# Patient Record
Sex: MALE | URBAN - METROPOLITAN AREA
[De-identification: names, ages, dates, MRNs, and addresses within clinical notes are randomized per-mention and may not be internally consistent; named-entity substitution may affect disease eponyms.]

---

## 2017-03-25 ENCOUNTER — EMERGENCY (EMERGENCY)
Facility: HOSPITAL | Age: 42
LOS: 1 days | Discharge: HOME | End: 2017-03-25
Attending: INTERNAL MEDICINE | Admitting: INTERNAL MEDICINE
Payer: COMMERCIAL

## 2017-03-25 VITALS
OXYGEN SATURATION: 97 % | TEMPERATURE: 98 F | HEART RATE: 96 BPM | HEIGHT: 70 IN | DIASTOLIC BLOOD PRESSURE: 116 MMHG | SYSTOLIC BLOOD PRESSURE: 182 MMHG | RESPIRATION RATE: 18 BRPM | WEIGHT: 199.96 LBS

## 2017-03-25 VITALS
RESPIRATION RATE: 14 BRPM | TEMPERATURE: 99 F | HEART RATE: 74 BPM | OXYGEN SATURATION: 99 % | DIASTOLIC BLOOD PRESSURE: 76 MMHG | SYSTOLIC BLOOD PRESSURE: 142 MMHG

## 2017-03-25 DIAGNOSIS — R07.89 OTHER CHEST PAIN: ICD-10-CM

## 2017-03-25 DIAGNOSIS — F41.9 ANXIETY DISORDER, UNSPECIFIED: ICD-10-CM

## 2017-03-25 DIAGNOSIS — Z41.1 ENCOUNTER FOR COSMETIC SURGERY: Chronic | ICD-10-CM

## 2017-03-25 DIAGNOSIS — R10.10 UPPER ABDOMINAL PAIN, UNSPECIFIED: ICD-10-CM

## 2017-03-25 DIAGNOSIS — R10.13 EPIGASTRIC PAIN: ICD-10-CM

## 2017-03-25 DIAGNOSIS — Z88.0 ALLERGY STATUS TO PENICILLIN: ICD-10-CM

## 2017-03-25 DIAGNOSIS — F17.210 NICOTINE DEPENDENCE, CIGARETTES, UNCOMPLICATED: ICD-10-CM

## 2017-03-25 LAB
ALBUMIN SERPL ELPH-MCNC: 4 G/DL — SIGNIFICANT CHANGE UP (ref 3.3–5)
ALP SERPL-CCNC: 88 U/L — SIGNIFICANT CHANGE UP (ref 40–120)
ALT FLD-CCNC: 55 U/L — SIGNIFICANT CHANGE UP (ref 12–78)
ANION GAP SERPL CALC-SCNC: 7 MMOL/L — SIGNIFICANT CHANGE UP (ref 5–17)
APPEARANCE UR: CLEAR — SIGNIFICANT CHANGE UP
AST SERPL-CCNC: 24 U/L — SIGNIFICANT CHANGE UP (ref 15–37)
BILIRUB SERPL-MCNC: 0.7 MG/DL — SIGNIFICANT CHANGE UP (ref 0.2–1.2)
BILIRUB UR-MCNC: NEGATIVE — SIGNIFICANT CHANGE UP
BUN SERPL-MCNC: 14 MG/DL — SIGNIFICANT CHANGE UP (ref 7–23)
CALCIUM SERPL-MCNC: 9.2 MG/DL — SIGNIFICANT CHANGE UP (ref 8.5–10.1)
CHLORIDE SERPL-SCNC: 103 MMOL/L — SIGNIFICANT CHANGE UP (ref 96–108)
CK SERPL-CCNC: 180 U/L — SIGNIFICANT CHANGE UP (ref 26–308)
CK SERPL-CCNC: 221 U/L — SIGNIFICANT CHANGE UP (ref 26–308)
CO2 SERPL-SCNC: 30 MMOL/L — SIGNIFICANT CHANGE UP (ref 22–31)
COLOR SPEC: YELLOW — SIGNIFICANT CHANGE UP
CREAT SERPL-MCNC: 1.1 MG/DL — SIGNIFICANT CHANGE UP (ref 0.5–1.3)
DIFF PNL FLD: NEGATIVE — SIGNIFICANT CHANGE UP
GLUCOSE SERPL-MCNC: 97 MG/DL — SIGNIFICANT CHANGE UP (ref 70–99)
GLUCOSE UR QL: NEGATIVE — SIGNIFICANT CHANGE UP
HCT VFR BLD CALC: 48.6 % — SIGNIFICANT CHANGE UP (ref 39–50)
HGB BLD-MCNC: 16.6 G/DL — SIGNIFICANT CHANGE UP (ref 13–17)
KETONES UR-MCNC: NEGATIVE — SIGNIFICANT CHANGE UP
LEUKOCYTE ESTERASE UR-ACNC: NEGATIVE — SIGNIFICANT CHANGE UP
MCHC RBC-ENTMCNC: 30.2 PG — SIGNIFICANT CHANGE UP (ref 27–34)
MCHC RBC-ENTMCNC: 34.3 GM/DL — SIGNIFICANT CHANGE UP (ref 32–36)
MCV RBC AUTO: 88.3 FL — SIGNIFICANT CHANGE UP (ref 80–100)
NITRITE UR-MCNC: NEGATIVE — SIGNIFICANT CHANGE UP
PH UR: 8 — SIGNIFICANT CHANGE UP (ref 4.8–8)
PLATELET # BLD AUTO: 288 K/UL — SIGNIFICANT CHANGE UP (ref 150–400)
POTASSIUM SERPL-MCNC: 4 MMOL/L — SIGNIFICANT CHANGE UP (ref 3.5–5.3)
POTASSIUM SERPL-SCNC: 4 MMOL/L — SIGNIFICANT CHANGE UP (ref 3.5–5.3)
PROT SERPL-MCNC: 7.2 G/DL — SIGNIFICANT CHANGE UP (ref 6–8.3)
PROT UR-MCNC: NEGATIVE — SIGNIFICANT CHANGE UP
RBC # BLD: 5.5 M/UL — SIGNIFICANT CHANGE UP (ref 4.2–5.8)
RBC # FLD: 11.2 % — SIGNIFICANT CHANGE UP (ref 10.3–14.5)
SODIUM SERPL-SCNC: 140 MMOL/L — SIGNIFICANT CHANGE UP (ref 135–145)
SP GR SPEC: 1.01 — SIGNIFICANT CHANGE UP (ref 1.01–1.02)
TROPONIN I SERPL-MCNC: <.015 NG/ML — SIGNIFICANT CHANGE UP (ref 0.01–0.04)
TROPONIN I SERPL-MCNC: <.015 NG/ML — SIGNIFICANT CHANGE UP (ref 0.01–0.04)
UROBILINOGEN FLD QL: NEGATIVE — SIGNIFICANT CHANGE UP
WBC # BLD: 13.2 K/UL — HIGH (ref 3.8–10.5)
WBC # FLD AUTO: 13.2 K/UL — HIGH (ref 3.8–10.5)

## 2017-03-25 PROCEDURE — 80053 COMPREHEN METABOLIC PANEL: CPT

## 2017-03-25 PROCEDURE — 76705 ECHO EXAM OF ABDOMEN: CPT | Mod: 26

## 2017-03-25 PROCEDURE — 99255 IP/OBS CONSLTJ NEW/EST HI 80: CPT

## 2017-03-25 PROCEDURE — 71010: CPT | Mod: 26

## 2017-03-25 PROCEDURE — 82550 ASSAY OF CK (CPK): CPT

## 2017-03-25 PROCEDURE — 87086 URINE CULTURE/COLONY COUNT: CPT

## 2017-03-25 PROCEDURE — 76705 ECHO EXAM OF ABDOMEN: CPT

## 2017-03-25 PROCEDURE — 81003 URINALYSIS AUTO W/O SCOPE: CPT

## 2017-03-25 PROCEDURE — 96374 THER/PROPH/DIAG INJ IV PUSH: CPT

## 2017-03-25 PROCEDURE — 84484 ASSAY OF TROPONIN QUANT: CPT

## 2017-03-25 PROCEDURE — 85027 COMPLETE CBC AUTOMATED: CPT

## 2017-03-25 PROCEDURE — 99285 EMERGENCY DEPT VISIT HI MDM: CPT | Mod: 25

## 2017-03-25 PROCEDURE — 93010 ELECTROCARDIOGRAM REPORT: CPT

## 2017-03-25 PROCEDURE — 71045 X-RAY EXAM CHEST 1 VIEW: CPT

## 2017-03-25 PROCEDURE — 93005 ELECTROCARDIOGRAM TRACING: CPT

## 2017-03-25 PROCEDURE — 99284 EMERGENCY DEPT VISIT MOD MDM: CPT | Mod: 25

## 2017-03-25 RX ORDER — LABETALOL HCL 100 MG
10 TABLET ORAL ONCE
Qty: 0 | Refills: 0 | Status: DISCONTINUED | OUTPATIENT
Start: 2017-03-25 | End: 2017-03-25

## 2017-03-25 RX ORDER — PANTOPRAZOLE SODIUM 20 MG/1
40 TABLET, DELAYED RELEASE ORAL ONCE
Qty: 0 | Refills: 0 | Status: COMPLETED | OUTPATIENT
Start: 2017-03-25 | End: 2017-03-25

## 2017-03-25 RX ORDER — ASPIRIN/CALCIUM CARB/MAGNESIUM 324 MG
325 TABLET ORAL ONCE
Qty: 0 | Refills: 0 | Status: COMPLETED | OUTPATIENT
Start: 2017-03-25 | End: 2017-03-25

## 2017-03-25 RX ORDER — ALPRAZOLAM 0.25 MG
0.25 TABLET ORAL ONCE
Qty: 0 | Refills: 0 | Status: DISCONTINUED | OUTPATIENT
Start: 2017-03-25 | End: 2017-03-25

## 2017-03-25 RX ADMIN — Medication 325 MILLIGRAM(S): at 03:28

## 2017-03-25 RX ADMIN — Medication 0.25 MILLIGRAM(S): at 03:28

## 2017-03-25 RX ADMIN — PANTOPRAZOLE SODIUM 40 MILLIGRAM(S): 20 TABLET, DELAYED RELEASE ORAL at 03:28

## 2017-03-25 NOTE — ED ADULT NURSE REASSESSMENT NOTE - GENERAL PATIENT STATE
cooperative/comfortable appearance/improvement verbalized
cooperative/improvement verbalized/comfortable appearance
comfortable appearance/cooperative

## 2017-03-25 NOTE — ED ADULT NURSE REASSESSMENT NOTE - COMFORT CARE
assisted to bathroom
po fluids offered/plan of care explained
plan of care explained/meal provided/po fluids offered

## 2017-03-25 NOTE — ED ADULT NURSE NOTE - NS ED NURSE DC INFO COMPLEXITY
Simple: Patient demonstrates quick and easy understanding/Patient asked questions/Verbalized Understanding/Returned Demonstration

## 2017-03-26 LAB
CULTURE RESULTS: NO GROWTH — SIGNIFICANT CHANGE UP
SPECIMEN SOURCE: SIGNIFICANT CHANGE UP

## 2017-06-05 ENCOUNTER — EMERGENCY (EMERGENCY)
Facility: HOSPITAL | Age: 42
LOS: 1 days | Discharge: HOME | End: 2017-06-05
Attending: EMERGENCY MEDICINE | Admitting: EMERGENCY MEDICINE
Payer: COMMERCIAL

## 2017-06-05 VITALS
HEIGHT: 70 IN | WEIGHT: 205.03 LBS | DIASTOLIC BLOOD PRESSURE: 104 MMHG | RESPIRATION RATE: 12 BRPM | OXYGEN SATURATION: 99 % | SYSTOLIC BLOOD PRESSURE: 177 MMHG | HEART RATE: 90 BPM | TEMPERATURE: 98 F

## 2017-06-05 VITALS
DIASTOLIC BLOOD PRESSURE: 84 MMHG | HEART RATE: 73 BPM | TEMPERATURE: 98 F | RESPIRATION RATE: 18 BRPM | OXYGEN SATURATION: 98 % | SYSTOLIC BLOOD PRESSURE: 154 MMHG

## 2017-06-05 DIAGNOSIS — Z88.0 ALLERGY STATUS TO PENICILLIN: ICD-10-CM

## 2017-06-05 DIAGNOSIS — Z41.1 ENCOUNTER FOR COSMETIC SURGERY: Chronic | ICD-10-CM

## 2017-06-05 DIAGNOSIS — R07.9 CHEST PAIN, UNSPECIFIED: ICD-10-CM

## 2017-06-05 DIAGNOSIS — R51 HEADACHE: ICD-10-CM

## 2017-06-05 LAB
ANION GAP SERPL CALC-SCNC: 5 MMOL/L — SIGNIFICANT CHANGE UP (ref 5–17)
BUN SERPL-MCNC: 14 MG/DL — SIGNIFICANT CHANGE UP (ref 7–23)
CALCIUM SERPL-MCNC: 9 MG/DL — SIGNIFICANT CHANGE UP (ref 8.5–10.1)
CHLORIDE SERPL-SCNC: 104 MMOL/L — SIGNIFICANT CHANGE UP (ref 96–108)
CK MB BLD-MCNC: 0.7 % — SIGNIFICANT CHANGE UP (ref 0–3.5)
CK MB CFR SERPL CALC: 3.2 NG/ML — SIGNIFICANT CHANGE UP (ref 0–3.6)
CK SERPL-CCNC: 439 U/L — HIGH (ref 26–308)
CO2 SERPL-SCNC: 32 MMOL/L — HIGH (ref 22–31)
CREAT SERPL-MCNC: 1.1 MG/DL — SIGNIFICANT CHANGE UP (ref 0.5–1.3)
GLUCOSE SERPL-MCNC: 90 MG/DL — SIGNIFICANT CHANGE UP (ref 70–99)
HCT VFR BLD CALC: 46.8 % — SIGNIFICANT CHANGE UP (ref 39–50)
HGB BLD-MCNC: 16.1 G/DL — SIGNIFICANT CHANGE UP (ref 13–17)
MCHC RBC-ENTMCNC: 31.5 PG — SIGNIFICANT CHANGE UP (ref 27–34)
MCHC RBC-ENTMCNC: 34.3 GM/DL — SIGNIFICANT CHANGE UP (ref 32–36)
MCV RBC AUTO: 91.8 FL — SIGNIFICANT CHANGE UP (ref 80–100)
PLATELET # BLD AUTO: 258 K/UL — SIGNIFICANT CHANGE UP (ref 150–400)
POTASSIUM SERPL-MCNC: 3.9 MMOL/L — SIGNIFICANT CHANGE UP (ref 3.5–5.3)
POTASSIUM SERPL-SCNC: 3.9 MMOL/L — SIGNIFICANT CHANGE UP (ref 3.5–5.3)
RBC # BLD: 5.1 M/UL — SIGNIFICANT CHANGE UP (ref 4.2–5.8)
RBC # FLD: 11.4 % — SIGNIFICANT CHANGE UP (ref 10.3–14.5)
SODIUM SERPL-SCNC: 141 MMOL/L — SIGNIFICANT CHANGE UP (ref 135–145)
TROPONIN I SERPL-MCNC: <.015 NG/ML — SIGNIFICANT CHANGE UP (ref 0.01–0.04)
WBC # BLD: 8.3 K/UL — SIGNIFICANT CHANGE UP (ref 3.8–10.5)
WBC # FLD AUTO: 8.3 K/UL — SIGNIFICANT CHANGE UP (ref 3.8–10.5)

## 2017-06-05 PROCEDURE — 36000 PLACE NEEDLE IN VEIN: CPT

## 2017-06-05 PROCEDURE — 84484 ASSAY OF TROPONIN QUANT: CPT

## 2017-06-05 PROCEDURE — 93005 ELECTROCARDIOGRAM TRACING: CPT

## 2017-06-05 PROCEDURE — 70450 CT HEAD/BRAIN W/O DYE: CPT

## 2017-06-05 PROCEDURE — 99284 EMERGENCY DEPT VISIT MOD MDM: CPT

## 2017-06-05 PROCEDURE — 80048 BASIC METABOLIC PNL TOTAL CA: CPT

## 2017-06-05 PROCEDURE — 71010: CPT | Mod: 26

## 2017-06-05 PROCEDURE — 82553 CREATINE MB FRACTION: CPT

## 2017-06-05 PROCEDURE — 85027 COMPLETE CBC AUTOMATED: CPT

## 2017-06-05 PROCEDURE — 82550 ASSAY OF CK (CPK): CPT

## 2017-06-05 PROCEDURE — 71045 X-RAY EXAM CHEST 1 VIEW: CPT

## 2017-06-05 PROCEDURE — 70450 CT HEAD/BRAIN W/O DYE: CPT | Mod: 26

## 2017-06-05 RX ORDER — SODIUM CHLORIDE 9 MG/ML
1000 INJECTION INTRAMUSCULAR; INTRAVENOUS; SUBCUTANEOUS ONCE
Qty: 0 | Refills: 0 | Status: COMPLETED | OUTPATIENT
Start: 2017-06-05 | End: 2017-06-05

## 2017-06-05 RX ADMIN — SODIUM CHLORIDE 1000 MILLILITER(S): 9 INJECTION INTRAMUSCULAR; INTRAVENOUS; SUBCUTANEOUS at 14:50

## 2017-06-05 NOTE — ED PROVIDER NOTE - CARE PLAN
Principal Discharge DX:	Headache, unspecified headache type  Secondary Diagnosis:	Chest pain in adult

## 2017-06-05 NOTE — ED PROVIDER NOTE - CHPI ED SYMPTOMS NEG
no confusion/no change in level of consciousness/no blurred vision/no dizziness/no fever/no numbness/no vomiting/no loss of consciousness

## 2017-06-05 NOTE — ED PROVIDER NOTE - PROGRESS NOTE DETAILS
Pt resting comfortably. No distress.  Reviewed all results with pt and family.  Discussed elevated BP.  Will f/u with pmd tomorrow.  Given detailed return instructions.

## 2017-06-05 NOTE — ED PROVIDER NOTE - OBJECTIVE STATEMENT
42 y/o M with h/o anxiety p/w multiple complaints over the past 2 weeks.  States he has been having headaches and feeling tired.  Also has occasional chest discomfort. 42 y/o M with h/o anxiety p/w multiple complaints over the past 2 weeks.  States he has been having headaches and feeling tired.  Also has occasional chest discomfort and feels bloated.  Denies SOB, fever, or other complaints.

## 2020-11-29 ENCOUNTER — TRANSCRIPTION ENCOUNTER (OUTPATIENT)
Age: 45
End: 2020-11-29

## 2021-01-06 ENCOUNTER — TRANSCRIPTION ENCOUNTER (OUTPATIENT)
Age: 46
End: 2021-01-06

## 2022-04-19 ENCOUNTER — TRANSCRIPTION ENCOUNTER (OUTPATIENT)
Age: 47
End: 2022-04-19

## 2023-10-05 ENCOUNTER — NON-APPOINTMENT (OUTPATIENT)
Age: 48
End: 2023-10-05

## 2024-06-13 ENCOUNTER — NON-APPOINTMENT (OUTPATIENT)
Age: 49
End: 2024-06-13

## 2024-08-15 PROBLEM — F41.9 ANXIETY DISORDER, UNSPECIFIED: Chronic | Status: ACTIVE | Noted: 2017-03-25

## 2024-09-05 ENCOUNTER — TRANSCRIPTION ENCOUNTER (OUTPATIENT)
Age: 49
End: 2024-09-05

## 2024-09-05 ENCOUNTER — INPATIENT (INPATIENT)
Facility: HOSPITAL | Age: 49
LOS: 0 days | Discharge: HOME | DRG: 313 | End: 2024-09-06
Attending: INTERNAL MEDICINE | Admitting: INTERNAL MEDICINE
Payer: COMMERCIAL

## 2024-09-05 VITALS
DIASTOLIC BLOOD PRESSURE: 83 MMHG | SYSTOLIC BLOOD PRESSURE: 127 MMHG | TEMPERATURE: 98 F | HEART RATE: 81 BPM | RESPIRATION RATE: 14 BRPM | OXYGEN SATURATION: 100 % | WEIGHT: 195.11 LBS | HEIGHT: 70 IN

## 2024-09-05 DIAGNOSIS — R07.89 OTHER CHEST PAIN: ICD-10-CM

## 2024-09-05 DIAGNOSIS — Z41.1 ENCOUNTER FOR COSMETIC SURGERY: Chronic | ICD-10-CM

## 2024-09-05 LAB
ANION GAP SERPL CALC-SCNC: 6 MMOL/L — SIGNIFICANT CHANGE UP (ref 5–17)
BUN SERPL-MCNC: 13 MG/DL — SIGNIFICANT CHANGE UP (ref 7–23)
CALCIUM SERPL-MCNC: 9.4 MG/DL — SIGNIFICANT CHANGE UP (ref 8.5–10.1)
CHLORIDE SERPL-SCNC: 106 MMOL/L — SIGNIFICANT CHANGE UP (ref 96–108)
CO2 SERPL-SCNC: 26 MMOL/L — SIGNIFICANT CHANGE UP (ref 22–31)
CREAT SERPL-MCNC: 1.1 MG/DL — SIGNIFICANT CHANGE UP (ref 0.5–1.3)
EGFR: 84 ML/MIN/1.73M2 — SIGNIFICANT CHANGE UP
GLUCOSE SERPL-MCNC: 90 MG/DL — SIGNIFICANT CHANGE UP (ref 70–99)
HCT VFR BLD CALC: 45.7 % — SIGNIFICANT CHANGE UP (ref 39–50)
HGB BLD-MCNC: 15.3 G/DL — SIGNIFICANT CHANGE UP (ref 13–17)
MCHC RBC-ENTMCNC: 30.3 PG — SIGNIFICANT CHANGE UP (ref 27–34)
MCHC RBC-ENTMCNC: 33.5 GM/DL — SIGNIFICANT CHANGE UP (ref 32–36)
MCV RBC AUTO: 90.5 FL — SIGNIFICANT CHANGE UP (ref 80–100)
NRBC # BLD: 0 /100 WBCS — SIGNIFICANT CHANGE UP (ref 0–0)
PLATELET # BLD AUTO: 218 K/UL — SIGNIFICANT CHANGE UP (ref 150–400)
POTASSIUM SERPL-MCNC: 4.1 MMOL/L — SIGNIFICANT CHANGE UP (ref 3.5–5.3)
POTASSIUM SERPL-SCNC: 4.1 MMOL/L — SIGNIFICANT CHANGE UP (ref 3.5–5.3)
RBC # BLD: 5.05 M/UL — SIGNIFICANT CHANGE UP (ref 4.2–5.8)
RBC # FLD: 12.1 % — SIGNIFICANT CHANGE UP (ref 10.3–14.5)
SODIUM SERPL-SCNC: 138 MMOL/L — SIGNIFICANT CHANGE UP (ref 135–145)
WBC # BLD: 7.65 K/UL — SIGNIFICANT CHANGE UP (ref 3.8–10.5)
WBC # FLD AUTO: 7.65 K/UL — SIGNIFICANT CHANGE UP (ref 3.8–10.5)

## 2024-09-05 PROCEDURE — 92978 ENDOLUMINL IVUS OCT C 1ST: CPT | Mod: 26,LD

## 2024-09-05 PROCEDURE — 93010 ELECTROCARDIOGRAM REPORT: CPT

## 2024-09-05 PROCEDURE — 93458 L HRT ARTERY/VENTRICLE ANGIO: CPT | Mod: 26,59

## 2024-09-05 PROCEDURE — 99152 MOD SED SAME PHYS/QHP 5/>YRS: CPT

## 2024-09-05 PROCEDURE — 92928 PRQ TCAT PLMT NTRAC ST 1 LES: CPT | Mod: LD

## 2024-09-05 RX ORDER — ROSUVASTATIN CALCIUM 10 MG/1
20 TABLET ORAL AT BEDTIME
Refills: 0 | Status: DISCONTINUED | OUTPATIENT
Start: 2024-09-05 | End: 2024-09-06

## 2024-09-05 RX ORDER — ASPIRIN 81 MG
81 TABLET, DELAYED RELEASE (ENTERIC COATED) ORAL EVERY 24 HOURS
Refills: 0 | Status: DISCONTINUED | OUTPATIENT
Start: 2024-09-06 | End: 2024-09-06

## 2024-09-05 RX ORDER — SODIUM CHLORIDE 9 MG/ML
500 INJECTION INTRAMUSCULAR; INTRAVENOUS; SUBCUTANEOUS
Refills: 0 | Status: DISCONTINUED | OUTPATIENT
Start: 2024-09-05 | End: 2024-09-06

## 2024-09-05 RX ORDER — PAROXETINE 10 MG/1
30 TABLET, FILM COATED ORAL DAILY
Refills: 0 | Status: DISCONTINUED | OUTPATIENT
Start: 2024-09-05 | End: 2024-09-06

## 2024-09-05 RX ORDER — SODIUM CHLORIDE 9 MG/ML
500 INJECTION INTRAMUSCULAR; INTRAVENOUS; SUBCUTANEOUS
Refills: 0 | Status: DISCONTINUED | OUTPATIENT
Start: 2024-09-05 | End: 2024-09-05

## 2024-09-05 RX ORDER — ASPIRIN 81 MG
1 TABLET, DELAYED RELEASE (ENTERIC COATED) ORAL
Qty: 30 | Refills: 0
Start: 2024-09-05 | End: 2024-10-04

## 2024-09-05 RX ORDER — SODIUM CHLORIDE 9 MG/ML
250 INJECTION INTRAMUSCULAR; INTRAVENOUS; SUBCUTANEOUS ONCE
Refills: 0 | Status: COMPLETED | OUTPATIENT
Start: 2024-09-05 | End: 2024-09-05

## 2024-09-05 RX ORDER — ENALAPRIL MALEATE 5 MG/1
1 TABLET ORAL
Refills: 0 | DISCHARGE

## 2024-09-05 RX ORDER — ROSUVASTATIN CALCIUM 10 MG/1
1 TABLET ORAL
Qty: 30 | Refills: 0
Start: 2024-09-05 | End: 2024-10-04

## 2024-09-05 RX ORDER — PAROXETINE 10 MG/1
1 TABLET, FILM COATED ORAL
Refills: 0 | DISCHARGE

## 2024-09-05 RX ADMIN — PAROXETINE 30 MILLIGRAM(S): 10 TABLET, FILM COATED ORAL at 13:10

## 2024-09-05 RX ADMIN — SODIUM CHLORIDE 75 MILLILITER(S): 9 INJECTION INTRAMUSCULAR; INTRAVENOUS; SUBCUTANEOUS at 10:38

## 2024-09-05 RX ADMIN — ROSUVASTATIN CALCIUM 20 MILLIGRAM(S): 10 TABLET ORAL at 22:00

## 2024-09-05 RX ADMIN — SODIUM CHLORIDE 500 MILLILITER(S): 9 INJECTION INTRAMUSCULAR; INTRAVENOUS; SUBCUTANEOUS at 08:25

## 2024-09-05 NOTE — DISCHARGE NOTE PROVIDER - NSDCMRMEDTOKEN_GEN_ALL_CORE_FT
enalapril 10 mg oral tablet: 1 tab(s) orally once a day  Paxil 30 mg oral tablet: 1 tab(s) orally once a day   aspirin 81 mg oral delayed release tablet: 1 tab(s) orally once a day  aspirin 81 mg oral delayed release tablet: 1 tab(s) orally once a day  clopidogrel 75 mg oral tablet: 1 tab(s) orally once a day  clopidogrel 75 mg oral tablet: 1 tab(s) orally once a day  enalapril 10 mg oral tablet: 1 tab(s) orally once a day  nicotine 21 mg/24 hr transdermal film, extended release: 1 patch transdermally once a day  Paxil 30 mg oral tablet: 1 tab(s) orally once a day  rosuvastatin 20 mg oral tablet: 1 tab(s) orally once a day (at bedtime)  rosuvastatin 20 mg oral tablet: 1 tab(s) orally once a day (at bedtime)   aspirin 81 mg oral delayed release tablet: 1 tab(s) orally once a day  clopidogrel 75 mg oral tablet: 1 tab(s) orally every 24 hours  enalapril 10 mg oral tablet: 1 tab(s) orally once a day  nicotine 21 mg/24 hr transdermal film, extended release: 1 patch transdermal once a day  Paxil 30 mg oral tablet: 1 tab(s) orally once a day  rosuvastatin 20 mg oral tablet: 1 tab(s) orally once a day (at bedtime)

## 2024-09-05 NOTE — DISCHARGE NOTE PROVIDER - NSDCFUSCHEDAPPT_GEN_ALL_CORE_FT
Mary Clark  [facility]  CARDIOLOGY [address]  Scheduled Appointment: 09/13/2024

## 2024-09-05 NOTE — PATIENT PROFILE ADULT - FUNCTIONAL ASSESSMENT - BASIC MOBILITY 6.
4-calculated by average/Not able to assess (calculate score using [facility] averaging method)

## 2024-09-05 NOTE — DISCHARGE NOTE PROVIDER - NSDCFUADDINST_GEN_ALL_CORE_FT
Wound Care:   the day AFTER your procedure...     Remove the bandage from the site and gently clean with soap and water then pat dry; leave open to air.     You may take a brief shower     Do NOT apply lotions, creams, powders, ointments, or perfumes to your incision site unless prescribed by your physician     Do NOT soak your procedure site for 1 week (no baths, no pools, no tubs, etc...)     Check  your groin and /or wrist daily. A small amount of bruising, and soreness are normal    ACTIVITY: for 24 hours      - DO NOT DRIVE     - DO NOT make any important decisions or sign legal documents      - DO NOT operate heavy machinery      - you may resume sexual activity in 48 hours, unless otherwise instructed by your cardiologist          If your procedure was done through the WRIST: for the NEXT 3 DAYS:          - avoid pushing, pulling, with that affected wrist (such as pushing up from a seated position)          - avoid repeated movement of that hand and wrist (such as typing or hammering)          - DO NOT LIFT anything more than 5 pounds         If your procedure was done through the GROIN: for the NEXT 5 DAYS          - Limit climbing stairs, DO NOT soak in bathtub or pool          - no strenous activities, pushing, pulling, straining          - Do not lift anything more than 10 pounds     MEDICATION:      Please take your medications as explained to you (found on your discharge paperwork)      If you received a stent, you will be taking medication to KEEP YOUR STENT OPEN.            You MUST start taking this medication immediately.           Take this medication as prescribed and uninterrupted.            DO NOT STOP taking them for any reason without consulting with your cardiologist first.      **if you have diabetes and take metformin please do not take this medication for 2 days after the procedure. Restart and take as usual starting day 3.    Follow the heart healthy diet recommended by your doctor.   Drink plenty of water for the next 24 hours unless otherwise instructed.  Do not drink any alcoholic beverages for 24 hours (beer, wine, liquor, etc).    If you smoke: STOP SMOKING. Call the Center for Tobacco Control at [phone] for assistance.    CALL your cardiologist/primary care doctor to make a follow-up appointment in 2 WEEKS     **CALL YOUR DOCTOR if you experience     fever, chills, body aches, or severe pain, swelling, redness, heat or yellow discharge at incision site     bleeding or excruciating pain at the procedural site, swelling (golf ball size) at your procedural site     CHEST PAIN     numbness, tingling, temperature change (of your procedural site)     Pain  -you may have pain after your surgery or procedure at the puncture site or in the artery/vein that has been treated.  -take pain medication as directed by your doctor.  call your doctor if your pain is not getting better within 5 days or if it gets worse  -prescription pain medication should be taken with food, and can cause constipation, an over-the-counter softener may be helpful    Nausea  -anesthesia/sedation can upset your stomach  -eat bland foods (Jell-o, crackers, toast) and drink ginger ale if you are nauseated  -drink plenty of fluids such as water or ginger ale (unless instructed otherwise by your doctor)  -if you have nausea or vomiting the day after your procedure, call your doctor    Bleeding  -you may have a small amount of oozing from your surgical or procedural site  -bleeding as the site can be dangerous and should prompt immediate medical attention    Infection  -if you have any of the following signs of infection, call your doctor:       redness, swelling, fever over 101 degrees, thick yellow/white drainage    If you are unable to reach your doctor, you may contact:   Dr Mary Clark @ [phone]    **Call 911 immediately if:     - your hand or leg becomes blue, feels cold to touch, or if you have numbness or tingling     - bleeding or swelling from your wrist or groin site cannot be controlled or if area becomes very red or hot to touch     - you have pain, pressure, tightness or burning in your chest, arms, jaw or stomach; shortness of breath; nausea or excessive sweating; lightheadedness; dizziness or a fainting spell; or if you have sudden back or stomach pain     -you have rapid heartbeat or palpitations     - you have bright red blood in large amounts, severe pain at access site (wrist or groin) or significant new swelling at the puncture site    If/because you had anesthesia, for the next 24 hours you should NOT:  -drive a car, operate power tool or machinery  -drink alcohol, beer, or wine  -make important personal or business decisions  If you had any type of sedation, you may experience lightheadedness, dizziness, or sleepiness following your procedure. A responsible adult should stay with you for at least 24 hours following your procedure.

## 2024-09-05 NOTE — PATIENT PROFILE ADULT - PUBLIC BENEFITS
Returned call to pt informed her will notify Dr Garcia of her request to be seen at [facility] at that time. She was thankful for the call back.   no

## 2024-09-05 NOTE — DISCHARGE NOTE PROVIDER - NSDCCPTREATMENT_GEN_ALL_CORE_FT
PRINCIPAL PROCEDURE  Procedure: Left heart catheterization  Findings and Treatment: 9/5/24 95% pLAD stenosis, s/p JASPER x 1, RCA 30%, Circ 50%      SECONDARY PROCEDURE  Procedure: Placement of LAD coronary artery stent  Findings and Treatment: 9/5/24 95% pLAD stenosis, s/p JASPER x 1

## 2024-09-05 NOTE — DISCHARGE NOTE PROVIDER - PROVIDER TOKENS
PROVIDER:[TOKEN:[[id number]],FOLLOWUP:[2 weeks]],FREE:[LAST:[Boutis],FIRST:[Mary],PHONE:[phone]],FAX:[(   )    -],ADDRESS:[[address] [place] [address]],SCHEDULEDAPPT:[09/13/2024]] PROVIDER:[TOKEN:[[id number]],FOLLOWUP:[2 weeks]],FREE:[LAST:[Boutis],FIRST:[Mary],PHONE:[phone]],FAX:[(   )    -],ADDRESS:[[address] [place] [address]],SCHEDULEDAPPT:[09/13/2024],SCHEDULEDAPPTTIME:[08:00 AM]]

## 2024-09-05 NOTE — CHART NOTE - NSCHARTNOTEFT_GEN_A_CORE
Post Cardiac Catheterization Chart Note      Prelim cath report:   LHC via RRA  RCA 30%  Circ 50%  pLAD 95%, s/p JASPER x 1  full/official report to follow      Patient without complaints. Denies CP, SOB, palpitations, N/V, fever/chills, abd pain, numbness/tingling/weakness, other c/o at this time.    A+O x 3, neurologically intact  RRA access site stable (clean, dry, intact, without bleeding, heat, erythema, or hematoma). Radial band in place.  RUE motor, neuro, circ intact.  Hemodynamically stable, neurologically intact, VS stable, afebrile    Post PCI ECG: NSR, no significant or acute changes form prior      A/P: s/p LHC      Admit to CPU for overnight observation post PCI  post cath/PCI routine VS, access site, neuro-vascular monitoring and RUE ***R groin/RLE post access precautions ordered  post cath access site precautions reviewed with pt who verbalized good understanding  post cath hydration as ordered  Bedrest. May get OOB 30 minutes after radial band removed if wrist and hemodynamics remain stable   EKG post cath done  f/u labs and EKG in am  continue dual anti platelet therapy with aspirin AND clopidogrel   Pt education provided/reinforced re: importance of strict adherence to uninterrupted DAPT for minimum of 9-12 months (cardiologist will determine duration)  Patient educated on benefits of Cardiac Rehab Program; referral provided to patient. Referral faxed and copy placed in medical record. Patient given list of locations with phone numbers of local rehab facilities and advised to contact their insurance company for participating providers. Patient educated on need to bring discharge documents including cardiovascular history, medications, and testing/treatments to first appointment.  statin started  restart ACEI in am if creat stable  may resume prior diet  Lifestyle modifications discussed to reduce cardiovascular risk factors including weight reduction, smoking cessation (referral provided if applicable), medication compliance, and routine follow up with Cardiologist to track your BMI, cholesterol, and glucose levels.   referral to [facility] tobacco cessation clinic, nictoine patch ordered and AHA life essential 8 information provided  Discharge in am if stable  follow-up next week with Dr Clark for post PCI check  follow-up in 2 weeks with outpatient/referring cardiologist  continue home medication regimen as appropriate

## 2024-09-05 NOTE — DISCHARGE NOTE PROVIDER - CARE PROVIDER_API CALL
Ramón Miller  Cardiovascular Disease  [address] [facility], [address]  [place] [address]  Phone: [phone]  Fax: [phone]  Follow Up Time: 2 weeks    Mary Clark  [address] [place] [address]  Phone: [phone]  Fax: (   )    -  Scheduled Appointment: 09/13/2024   Ramón Miller  Cardiovascular Disease  [address] [facility]  [place] [address]  Phone: [phone]  Fax: [phone]  Follow Up Time: 2 weeks    Mary Clark  [address] [place] [address]  Phone: [phone]  Fax: (   )    -  Scheduled Appointment: 09/13/2024 08:00 AM

## 2024-09-05 NOTE — ASU PATIENT PROFILE, ADULT - FALL HARM RISK - UNIVERSAL INTERVENTIONS
Bed in lowest position, wheels locked, appropriate side rails in place/Call bell, personal items and telephone in reach/Instruct patient to call for assistance before getting out of bed or chair/Non-slip footwear when patient is out of bed/[place] to call system/Physically safe environment - no spills, clutter or unnecessary equipment/Purposeful Proactive Rounding/Room/bathroom lighting operational, light cord in reach

## 2024-09-05 NOTE — H&P CARDIOLOGY - HISTORY OF PRESENT ILLNESS
48 year old male with PMH anxiety and HTN. Pt follows with Dr Miller. Reports "passed stress test 4 months ago with flying colors" but continues to have exertional angina as recently as yesterday. Pt c/o CP, L arm and jaw pain, numbness/tingling in left hand/fingers when exercising.    Pt presents today for elective LHC with Dr Clark. Feeling well, no c/o offered. Denies CP, SOB, palpitations, N/V, fever/chills, abd pain, numbness/tingling/weakness, other c/o at this time.  48 year old male with PMH anxiety and HTN. Pt follows with Dr Miller. Reports "passed stress test 4 months ago with flying colors" but continues to have exertional angina as recently as yesterday. Pt c/o CP, L arm and jaw pain, numbness/tingling in left hand/fingers when exercising.    8/4/24 to [facility] ED with chest pain radiating to jaw and down left arm x 1 day. Episodes are intermittent, lasting 3 to 5 minutes.  Resolves without intervention. Pain is worst in the L arm > jaw > chest.  Notes he was doubled over in pain during his last episode. Denies exertional activity during onset.  Last episode was while EKG was being done in triage. Has been taking ibuprofen for pain control, last dose 4 hours ago. Endorses some nausea during episodes. Troponin WNL x 2, d/c'd home.    Pt presents today for elective LHC with Dr Clark. Feeling well, no c/o offered. Denies CP, SOB, palpitations, N/V, fever/chills, abd pain, numbness/tingling/weakness, other c/o at this time.

## 2024-09-05 NOTE — DISCHARGE NOTE PROVIDER - HOSPITAL COURSE
48 year old male with PMH anxiety and HTN. Pt follows with Dr Miller. Reports "passed stress test 4 months ago with flying colors" but continues to have exertional angina as recently as yesterday. Pt c/o CP, L arm and jaw pain, numbness/tingling in left hand/fingers when exercising.    8/4/24 to [facility] ED with chest pain radiating to jaw and down left arm x 1 day. Episodes are intermittent, lasting 3 to 5 minutes.  Resolves without intervention. Pain is worst in the L arm > jaw > chest.  Notes he was doubled over in pain during his last episode. Denies exertional activity during onset.  Last episode was while EKG was being done in triage. Has been taking ibuprofen for pain control, last dose 4 hours ago. Endorses some nausea during episodes. Troponin WNL x 2, d/c'd home.    Pt presents today for elective LHC with Dr Clark. Feeling well, no c/o offered. Denies CP, SOB, palpitations, N/V, fever/chills, abd pain, numbness/tingling/weakness, other c/o at this time.     Prelim cath results:  RCA 30%  Circ 50%  pLAD 95%, s/p JASPER x 1 48 year old male with PMH anxiety and HTN. Pt follows with Dr Miller. Reports "passed stress test 4 months ago with flying colors" but continues to have exertional angina as recently as yesterday. Pt c/o CP, L arm and jaw pain, numbness/tingling in left hand/fingers when exercising.    8/4/24 to [facility] ED with chest pain radiating to jaw and down left arm x 1 day. Episodes are intermittent, lasting 3 to 5 minutes.  Resolves without intervention. Pain is worst in the L arm > jaw > chest.  Notes he was doubled over in pain during his last episode. Denies exertional activity during onset.  Last episode was while EKG was being done in triage. Has been taking ibuprofen for pain control, last dose 4 hours ago. Endorses some nausea during episodes. Troponin WNL x 2, d/c'd home.    Pt presents today for elective LHC with Dr Clark. Feeling well, no c/o offered. Denies CP, SOB, palpitations, N/V, fever/chills, abd pain, numbness/tingling/weakness, other c/o at this time.     Prelim cath results:  RCA 30%  Circ 50%  pLAD 95%, s/p JASPER x 1    9/6/24 No significant overnight events. LHC access site stable. Pt remains hemodynamically stable and is cleared for discharge as d/w Dr Carvajal.

## 2024-09-05 NOTE — PROVIDER CONTACT NOTE (OTHER) - SITUATION
pt c/o abd pin and loose BM. Pt states discomfort is less after BM but con t to have discomfort. Pt denies chest pain Pt denies SOB breathing even unlabored.
<-- Click to add NO pertinent Past Medical History

## 2024-09-06 ENCOUNTER — TRANSCRIPTION ENCOUNTER (OUTPATIENT)
Age: 49
End: 2024-09-06

## 2024-09-06 VITALS
DIASTOLIC BLOOD PRESSURE: 81 MMHG | HEART RATE: 67 BPM | RESPIRATION RATE: 18 BRPM | SYSTOLIC BLOOD PRESSURE: 129 MMHG | OXYGEN SATURATION: 94 %

## 2024-09-06 LAB
ANION GAP SERPL CALC-SCNC: 6 MMOL/L — SIGNIFICANT CHANGE UP (ref 5–17)
BUN SERPL-MCNC: 12 MG/DL — SIGNIFICANT CHANGE UP (ref 7–23)
CALCIUM SERPL-MCNC: 8.8 MG/DL — SIGNIFICANT CHANGE UP (ref 8.5–10.1)
CHLORIDE SERPL-SCNC: 107 MMOL/L — SIGNIFICANT CHANGE UP (ref 96–108)
CO2 SERPL-SCNC: 26 MMOL/L — SIGNIFICANT CHANGE UP (ref 22–31)
CREAT SERPL-MCNC: 1.1 MG/DL — SIGNIFICANT CHANGE UP (ref 0.5–1.3)
EGFR: 84 ML/MIN/1.73M2 — SIGNIFICANT CHANGE UP
GLUCOSE SERPL-MCNC: 98 MG/DL — SIGNIFICANT CHANGE UP (ref 70–99)
HCT VFR BLD CALC: 43.5 % — SIGNIFICANT CHANGE UP (ref 39–50)
HGB BLD-MCNC: 14.7 G/DL — SIGNIFICANT CHANGE UP (ref 13–17)
MAGNESIUM SERPL-MCNC: 1.9 MG/DL — SIGNIFICANT CHANGE UP (ref 1.6–2.6)
MCHC RBC-ENTMCNC: 30.6 PG — SIGNIFICANT CHANGE UP (ref 27–34)
MCHC RBC-ENTMCNC: 33.8 GM/DL — SIGNIFICANT CHANGE UP (ref 32–36)
MCV RBC AUTO: 90.6 FL — SIGNIFICANT CHANGE UP (ref 80–100)
NRBC # BLD: 0 /100 WBCS — SIGNIFICANT CHANGE UP (ref 0–0)
PLATELET # BLD AUTO: 211 K/UL — SIGNIFICANT CHANGE UP (ref 150–400)
POTASSIUM SERPL-MCNC: 3.9 MMOL/L — SIGNIFICANT CHANGE UP (ref 3.5–5.3)
POTASSIUM SERPL-SCNC: 3.9 MMOL/L — SIGNIFICANT CHANGE UP (ref 3.5–5.3)
RBC # BLD: 4.8 M/UL — SIGNIFICANT CHANGE UP (ref 4.2–5.8)
RBC # FLD: 12 % — SIGNIFICANT CHANGE UP (ref 10.3–14.5)
SODIUM SERPL-SCNC: 139 MMOL/L — SIGNIFICANT CHANGE UP (ref 135–145)
WBC # BLD: 9.51 K/UL — SIGNIFICANT CHANGE UP (ref 3.8–10.5)
WBC # FLD AUTO: 9.51 K/UL — SIGNIFICANT CHANGE UP (ref 3.8–10.5)

## 2024-09-06 PROCEDURE — 85027 COMPLETE CBC AUTOMATED: CPT

## 2024-09-06 PROCEDURE — 93458 L HRT ARTERY/VENTRICLE ANGIO: CPT | Mod: 59

## 2024-09-06 PROCEDURE — 36415 COLL VENOUS BLD VENIPUNCTURE: CPT

## 2024-09-06 PROCEDURE — 99232 SBSQ HOSP IP/OBS MODERATE 35: CPT

## 2024-09-06 PROCEDURE — 93005 ELECTROCARDIOGRAM TRACING: CPT

## 2024-09-06 PROCEDURE — C1753: CPT

## 2024-09-06 PROCEDURE — 93010 ELECTROCARDIOGRAM REPORT: CPT

## 2024-09-06 PROCEDURE — C1769: CPT

## 2024-09-06 PROCEDURE — C1887: CPT

## 2024-09-06 PROCEDURE — 92978 ENDOLUMINL IVUS OCT C 1ST: CPT | Mod: LD

## 2024-09-06 PROCEDURE — C1725: CPT

## 2024-09-06 PROCEDURE — C1894: CPT

## 2024-09-06 PROCEDURE — C1874: CPT

## 2024-09-06 PROCEDURE — 80048 BASIC METABOLIC PNL TOTAL CA: CPT

## 2024-09-06 PROCEDURE — 83735 ASSAY OF MAGNESIUM: CPT

## 2024-09-06 PROCEDURE — C9600: CPT | Mod: LD

## 2024-09-06 RX ADMIN — Medication 75 MILLIGRAM(S): at 05:20

## 2024-09-06 RX ADMIN — Medication 81 MILLIGRAM(S): at 05:20

## 2024-09-06 NOTE — DISCHARGE NOTE NURSING/CASE MANAGEMENT/SOCIAL WORK - NSDCPEWEB_GEN_ALL_CORE
[facility] for Tobacco Control website --- [email or web address] website --- [email or web address]

## 2024-09-06 NOTE — DISCHARGE NOTE NURSING/CASE MANAGEMENT/SOCIAL WORK - NSDCPEEMAIL_GEN_ALL_CORE
[facility] for Tobacco Control email [email or web address]

## 2024-09-06 NOTE — PROGRESS NOTE ADULT - ASSESSMENT
48 year old male with PMH anxiety and HTN. Pt follows with Dr Miller. Reports "passed stress test 4 months ago with flying colors" but continues to have exertional angina as recently as yesterday. Pt c/o CP, L arm and jaw pain, numbness/tingling in left hand/fingers when exercising.    8/4/24 to [facility] ED with chest pain radiating to jaw and down left arm x 1 day. Episodes are intermittent, lasting 3 to 5 minutes.  Resolves without intervention. Pain is worst in the L arm > jaw > chest.  Notes he was doubled over in pain during his last episode. Denies exertional activity during onset.  Last episode was while EKG was being done in triage. Has been taking ibuprofen for pain control, last dose 4 hours ago. Endorses some nausea during episodes. Troponin WNL x 2, d/c'd home.    9/5/24 Pt presents today for elective LHC with Dr Clark. Feeling well, no c/o offered. Denies CP, SOB, palpitations, N/V, fever/chills, abd pain, numbness/tingling/weakness, other c/o at this time.  Prelim cath results:  RCA 30%  Circ 50%  pLAD 95%, s/p JASPER x 1    pt observed overnight in CPU  cleared for d/c  post cath access site precautions reviewed with pt who verbalized good understanding  activity as tolerated (except access site precautions)  am labs and EKG noted  continue dual anti platelet therapy with aspirin AND clopidogrel   patient's DAPT submitted as follows:       aspirin 81 mg PO daily x 30 days sent to [facility] in [place] ("meds to beds" program)       clopidogrel 75 mg PO daily x 30 days sent to [facility] in [place] ("meds to beds" program)       aspirin 81 mg PO daily x 90 days with 3 refills sent to patient's preferred pharmacy on record       ticagrelor 90 mg PO Q12H x 90 days with 3 refills sent to patient's preferred pharmacy on record    Pt education provided/reinforced re: importance of strict adherence to uninterrupted DAPT for minimum of 9-12 months (cardiologist will determine duration)  Patient educated on benefits of Cardiac Rehab Program; referral provided to patient. Referral faxed and copy placed in medical record. Patient given list of locations with phone numbers of local rehab facilities and advised to contact their insurance company for participating providers. Patient educated on need to bring discharge documents including cardiovascular history, medications, and testing/treatments to first appointment.  continue statin, ACEI  diet as tolerated  Lifestyle modifications discussed to reduce cardiovascular risk factors including weight reduction, smoking cessation (referral provided if applicable), medication compliance, and routine follow up with Cardiologist to track your BMI, cholesterol, and glucose levels.   follow-up next with Dr Clark for post PCI check  follow-up in 2 weeks with outpatient/referring cardiologist

## 2024-09-06 NOTE — PHARMACOTHERAPY INTERVENTION NOTE - COMMENTS
Prescriptions filled at [facility].  Prescriptions: aspirin, clopidogrel, rosuvastatin, nicotine  Brought to bedside and counseled.  [facility] name: Tee Gleason Pharm. D  Person(s) counseled: Patient at bedside  Counseling materials provided/counseling aids used: Med leaflets  Time spent Counseling: 10 minutes  Counseling provided according to ASHP guidelines including side effects  Notes:  NP Toshia aware patient has their medications

## 2024-09-06 NOTE — DISCHARGE NOTE NURSING/CASE MANAGEMENT/SOCIAL WORK - PATIENT PORTAL LINK FT
You can access the FollowMyHealth Patient Portal offered by [facility] by registering at the following website: [email or web address]. By joining [email or web address] FollowMyHealth portal, you will also be able to view your health information using other applications (apps) compatible with our system.

## 2024-09-06 NOTE — PROGRESS NOTE ADULT - SUBJECTIVE AND OBJECTIVE BOX
Department of Cardiology                                                                     Division of Interventional Cardiology                                                                  [facility] [place] [address]                                                                             Telephone: [phone]                                                    Post- Procedure Note: Left Heart Cardiac Catheterization       48y  Male is now s/p left heart catheterization  Prelim cath results:  RCA 30%  Circ 50%  pLAD 95%, s/p JASPER x 1      Subjective/ROS: no c/o offered, eager for d/c. Denies CP, palpitations, SOB, N/V, fever/chills, dizziness, weakness, numbness/tingling.      HPI:  48 year old male with PMH anxiety and HTN. Pt follows with Dr Miller. Reports "passed stress test 4 months ago with flying colors" but continues to have exertional angina as recently as yesterday. Pt c/o CP, L arm and jaw pain, numbness/tingling in left hand/fingers when exercising.    8/4/24 to [facility] ED with chest pain radiating to jaw and down left arm x 1 day. Episodes are intermittent, lasting 3 to 5 minutes.  Resolves without intervention. Pain is worst in the L arm > jaw > chest.  Notes he was doubled over in pain during his last episode. Denies exertional activity during onset.  Last episode was while EKG was being done in triage. Has been taking ibuprofen for pain control, last dose 4 hours ago. Endorses some nausea during episodes. Troponin WNL x 2, d/c'd home.    Pt presents today for elective LHC with Dr Clark. Feeling well, no c/o offered. Denies CP, SOB, palpitations, N/V, fever/chills, abd pain, numbness/tingling/weakness, other c/o at this time.  (05 Sep 2024 08:11)      PAST MEDICAL & SURGICAL HISTORY:  Anxiety  HTN (hypertension)  H/O rhinoplasty      MEDICATIONS  (STANDING):  aspirin enteric coated 81 milliGRAM(s) Oral every 24 hours  clopidogrel Tablet 75 milliGRAM(s) Oral every 24 hours  nicotine - 21 mG/24Hr(s) Patch 1 Patch Transdermal daily  PARoxetine 30 milliGRAM(s) Oral daily  rosuvastatin 20 milliGRAM(s) Oral at bedtime    Allergies: penicillin (Unknown)                          14.7   9.51  )-----------( 211      ( 06 Sep 2024 06:06 )             43.5     09-06    139  |  107  |  12  ----------------------------<  98  3.9   |  26  |  1.10    Ca    8.8      06 Sep 2024 06:06  Mg     1.9     09-06 9/6/24 ECG:       Vital Signs Last 24 Hrs  T(C): 36.7 (06 Sep 2024 07:53), Max: 37.1 (05 Sep 2024 20:27)  T(F): 98 (06 Sep 2024 07:53), Max: 98.7 (05 Sep 2024 20:27)  HR: 65 (06 Sep 2024 04:03) (60 - 84)  Tele: NSR 50s-70s, no ectopy  BP: 136/88 (06 Sep 2024 04:03) (114/60 - 142/78)  BP(mean): 108 (06 Sep 2024 04:03) (91 - 108)  RR: 13 (06 Sep 2024 04:03) (13 - 24)  SpO2: 97% (06 Sep 2024 04:03) (94% - 100%) room air      Constitutional: NAD  Neuro: A+O x 3, non-focal, speech clear and intact  HEENT: NC/AT, PERRL, EOMI, anicteric sclerae, oral mucosa pink and moist  Neck: supple, no JVD  CV: regular rate, regular rhythm, +S1S2, no murmurs or rub  Pulm/chest: lung sounds CTA and equal bilaterally, no accessory muscle use noted  Abd: soft, NT, ND, +BS  Ext: MAHMOOD x 4, no C/C/E  Access site: R wrist C/D/I/soft without hematoma, distal motor/neuro/circ intact. R radial pulse 2+.  Skin: warm, well perfused  Psych: calm, appropriate affect                                                                          Department of Cardiology                                                                     Division of Interventional Cardiology                                                                  [facility]/ [place] [address]                                                                             Telephone: [phone]                                                    Post- Procedure Note: Left Heart Cardiac Catheterization       48y  Male is now s/p left heart catheterization  Prelim cath results:  RCA 30%  Circ 50%  pLAD 95%, s/p JASPER x 1      Subjective/ROS: no c/o offered, eager for d/c. Denies CP, palpitations, SOB, N/V, fever/chills, dizziness, weakness, numbness/tingling.      HPI:  48 year old male with PMH anxiety and HTN. Pt follows with Dr Miller. Reports "passed stress test 4 months ago with flying colors" but continues to have exertional angina as recently as yesterday. Pt c/o CP, L arm and jaw pain, numbness/tingling in left hand/fingers when exercising.    8/4/24 to [facility] ED with chest pain radiating to jaw and down left arm x 1 day. Episodes are intermittent, lasting 3 to 5 minutes.  Resolves without intervention. Pain is worst in the L arm > jaw > chest.  Notes he was doubled over in pain during his last episode. Denies exertional activity during onset.  Last episode was while EKG was being done in triage. Has been taking ibuprofen for pain control, last dose 4 hours ago. Endorses some nausea during episodes. Troponin WNL x 2, d/c'd home.    Pt presents today for elective LHC with Dr Clark. Feeling well, no c/o offered. Denies CP, SOB, palpitations, N/V, fever/chills, abd pain, numbness/tingling/weakness, other c/o at this time.  (05 Sep 2024 08:11)      PAST MEDICAL & SURGICAL HISTORY:  Anxiety  HTN (hypertension)  H/O rhinoplasty      MEDICATIONS  (STANDING):  aspirin enteric coated 81 milliGRAM(s) Oral every 24 hours  clopidogrel Tablet 75 milliGRAM(s) Oral every 24 hours  nicotine - 21 mG/24Hr(s) Patch 1 Patch Transdermal daily  PARoxetine 30 milliGRAM(s) Oral daily  rosuvastatin 20 milliGRAM(s) Oral at bedtime    Allergies: penicillin (Unknown)                          14.7   9.51  )-----------( 211      ( 06 Sep 2024 06:06 )             43.5     09-06    139  |  107  |  12  ----------------------------<  98  3.9   |  26  |  1.10    Ca    8.8      06 Sep 2024 06:06  Mg     1.9     09-06 9/6/24 @ 0537 ECG (my read): NSR @ 60, lateral T wave changes persist but no acute or significant change from prior      Vital Signs Last 24 Hrs  T(C): 36.7 (06 Sep 2024 07:53), Max: 37.1 (05 Sep 2024 20:27)  T(F): 98 (06 Sep 2024 07:53), Max: 98.7 (05 Sep 2024 20:27)  HR: 65 (06 Sep 2024 04:03) (60 - 84)  Tele: NSR 50s-70s, no ectopy  BP: 136/88 (06 Sep 2024 04:03) (114/60 - 142/78)  BP(mean): 108 (06 Sep 2024 04:03) (91 - 108)  RR: 13 (06 Sep 2024 04:03) (13 - 24)  SpO2: 97% (06 Sep 2024 04:03) (94% - 100%) room air      Constitutional: NAD  Neuro: A+O x 3, non-focal, speech clear and intact  HEENT: NC/AT, PERRL, EOMI, anicteric sclerae, oral mucosa pink and moist  Neck: supple, no JVD  CV: regular rate, regular rhythm, +S1S2, no murmurs or rub  Pulm/chest: lung sounds CTA and equal bilaterally, no accessory muscle use noted  Abd: soft, NT, ND, +BS  Ext: MAHMOOD x 4, no C/C/E  Access site: R wrist C/D/I/soft without hematoma, distal motor/neuro/circ intact. R radial pulse 2+.  Skin: warm, well perfused  Psych: calm, appropriate affect

## 2024-09-07 ENCOUNTER — INPATIENT (INPATIENT)
Facility: HOSPITAL | Age: 49
LOS: 0 days | Discharge: HOME | DRG: 313 | End: 2024-09-08
Attending: INTERNAL MEDICINE | Admitting: INTERNAL MEDICINE
Payer: COMMERCIAL

## 2024-09-07 ENCOUNTER — RESULT REVIEW (OUTPATIENT)
Age: 49
End: 2024-09-07

## 2024-09-07 VITALS
TEMPERATURE: 98 F | HEART RATE: 72 BPM | RESPIRATION RATE: 18 BRPM | SYSTOLIC BLOOD PRESSURE: 132 MMHG | DIASTOLIC BLOOD PRESSURE: 87 MMHG | OXYGEN SATURATION: 98 % | HEIGHT: 70 IN

## 2024-09-07 DIAGNOSIS — R07.9 CHEST PAIN, UNSPECIFIED: ICD-10-CM

## 2024-09-07 DIAGNOSIS — Z41.1 ENCOUNTER FOR COSMETIC SURGERY: Chronic | ICD-10-CM

## 2024-09-07 PROBLEM — I10 ESSENTIAL (PRIMARY) HYPERTENSION: Chronic | Status: ACTIVE | Noted: 2024-09-05

## 2024-09-07 LAB
ALBUMIN SERPL ELPH-MCNC: 4 G/DL — SIGNIFICANT CHANGE UP (ref 3.3–5)
ALP SERPL-CCNC: 90 U/L — SIGNIFICANT CHANGE UP (ref 40–120)
ALT FLD-CCNC: 33 U/L — SIGNIFICANT CHANGE UP (ref 12–78)
ANION GAP SERPL CALC-SCNC: 8 MMOL/L — SIGNIFICANT CHANGE UP (ref 5–17)
AST SERPL-CCNC: 21 U/L — SIGNIFICANT CHANGE UP (ref 15–37)
BASOPHILS # BLD AUTO: 0.06 K/UL — SIGNIFICANT CHANGE UP (ref 0–0.2)
BASOPHILS NFR BLD AUTO: 0.7 % — SIGNIFICANT CHANGE UP (ref 0–2)
BILIRUB SERPL-MCNC: 1.3 MG/DL — HIGH (ref 0.2–1.2)
BUN SERPL-MCNC: 13 MG/DL — SIGNIFICANT CHANGE UP (ref 7–23)
CALCIUM SERPL-MCNC: 9.7 MG/DL — SIGNIFICANT CHANGE UP (ref 8.5–10.1)
CHLORIDE SERPL-SCNC: 103 MMOL/L — SIGNIFICANT CHANGE UP (ref 96–108)
CO2 SERPL-SCNC: 27 MMOL/L — SIGNIFICANT CHANGE UP (ref 22–31)
CREAT SERPL-MCNC: 1.2 MG/DL — SIGNIFICANT CHANGE UP (ref 0.5–1.3)
EGFR: 76 ML/MIN/1.73M2 — SIGNIFICANT CHANGE UP
EOSINOPHIL # BLD AUTO: 0.33 K/UL — SIGNIFICANT CHANGE UP (ref 0–0.5)
EOSINOPHIL NFR BLD AUTO: 3.6 % — SIGNIFICANT CHANGE UP (ref 0–6)
GLUCOSE SERPL-MCNC: 119 MG/DL — HIGH (ref 70–99)
HCT VFR BLD CALC: 45.5 % — SIGNIFICANT CHANGE UP (ref 39–50)
HGB BLD-MCNC: 15.6 G/DL — SIGNIFICANT CHANGE UP (ref 13–17)
IMM GRANULOCYTES NFR BLD AUTO: 0.3 % — SIGNIFICANT CHANGE UP (ref 0–0.9)
LYMPHOCYTES # BLD AUTO: 2.18 K/UL — SIGNIFICANT CHANGE UP (ref 1–3.3)
LYMPHOCYTES # BLD AUTO: 24 % — SIGNIFICANT CHANGE UP (ref 13–44)
MCHC RBC-ENTMCNC: 30.8 PG — SIGNIFICANT CHANGE UP (ref 27–34)
MCHC RBC-ENTMCNC: 34.3 GM/DL — SIGNIFICANT CHANGE UP (ref 32–36)
MCV RBC AUTO: 89.9 FL — SIGNIFICANT CHANGE UP (ref 80–100)
MONOCYTES # BLD AUTO: 0.54 K/UL — SIGNIFICANT CHANGE UP (ref 0–0.9)
MONOCYTES NFR BLD AUTO: 5.9 % — SIGNIFICANT CHANGE UP (ref 2–14)
NEUTROPHILS # BLD AUTO: 5.94 K/UL — SIGNIFICANT CHANGE UP (ref 1.8–7.4)
NEUTROPHILS NFR BLD AUTO: 65.5 % — SIGNIFICANT CHANGE UP (ref 43–77)
NRBC # BLD: 0 /100 WBCS — SIGNIFICANT CHANGE UP (ref 0–0)
NT-PROBNP SERPL-SCNC: 39 PG/ML — SIGNIFICANT CHANGE UP (ref 0–125)
PLATELET # BLD AUTO: 232 K/UL — SIGNIFICANT CHANGE UP (ref 150–400)
POTASSIUM SERPL-MCNC: 3.8 MMOL/L — SIGNIFICANT CHANGE UP (ref 3.5–5.3)
POTASSIUM SERPL-SCNC: 3.8 MMOL/L — SIGNIFICANT CHANGE UP (ref 3.5–5.3)
PROT SERPL-MCNC: 7.4 G/DL — SIGNIFICANT CHANGE UP (ref 6–8.3)
RAPID RVP RESULT: SIGNIFICANT CHANGE UP
RBC # BLD: 5.06 M/UL — SIGNIFICANT CHANGE UP (ref 4.2–5.8)
RBC # FLD: 12.1 % — SIGNIFICANT CHANGE UP (ref 10.3–14.5)
SARS-COV-2 RNA SPEC QL NAA+PROBE: SIGNIFICANT CHANGE UP
SODIUM SERPL-SCNC: 138 MMOL/L — SIGNIFICANT CHANGE UP (ref 135–145)
TROPONIN I, HIGH SENSITIVITY RESULT: 58.5 NG/L — SIGNIFICANT CHANGE UP
TROPONIN I, HIGH SENSITIVITY RESULT: 65.7 NG/L — SIGNIFICANT CHANGE UP
TROPONIN I, HIGH SENSITIVITY RESULT: 70.8 NG/L — SIGNIFICANT CHANGE UP
WBC # BLD: 9.08 K/UL — SIGNIFICANT CHANGE UP (ref 3.8–10.5)
WBC # FLD AUTO: 9.08 K/UL — SIGNIFICANT CHANGE UP (ref 3.8–10.5)

## 2024-09-07 PROCEDURE — 71275 CT ANGIOGRAPHY CHEST: CPT | Mod: 26,MC

## 2024-09-07 PROCEDURE — 99285 EMERGENCY DEPT VISIT HI MDM: CPT

## 2024-09-07 RX ORDER — ASPIRIN 81 MG
81 TABLET, DELAYED RELEASE (ENTERIC COATED) ORAL DAILY
Refills: 0 | Status: DISCONTINUED | OUTPATIENT
Start: 2024-09-07 | End: 2024-09-08

## 2024-09-07 RX ORDER — SODIUM CHLORIDE 9 MG/ML
1000 INJECTION INTRAMUSCULAR; INTRAVENOUS; SUBCUTANEOUS ONCE
Refills: 0 | Status: COMPLETED | OUTPATIENT
Start: 2024-09-07 | End: 2024-09-07

## 2024-09-07 RX ORDER — ROSUVASTATIN CALCIUM 10 MG/1
20 TABLET ORAL AT BEDTIME
Refills: 0 | Status: DISCONTINUED | OUTPATIENT
Start: 2024-09-07 | End: 2024-09-08

## 2024-09-07 RX ORDER — ENALAPRIL MALEATE 5 MG/1
10 TABLET ORAL DAILY
Refills: 0 | Status: DISCONTINUED | OUTPATIENT
Start: 2024-09-07 | End: 2024-09-08

## 2024-09-07 RX ORDER — PAROXETINE 10 MG/1
30 TABLET, FILM COATED ORAL DAILY
Refills: 0 | Status: DISCONTINUED | OUTPATIENT
Start: 2024-09-07 | End: 2024-09-08

## 2024-09-07 RX ORDER — ACETAMINOPHEN 325 MG/1
650 TABLET ORAL EVERY 6 HOURS
Refills: 0 | Status: DISCONTINUED | OUTPATIENT
Start: 2024-09-07 | End: 2024-09-08

## 2024-09-07 RX ADMIN — SODIUM CHLORIDE 1000 MILLILITER(S): 9 INJECTION INTRAMUSCULAR; INTRAVENOUS; SUBCUTANEOUS at 14:16

## 2024-09-07 RX ADMIN — ROSUVASTATIN CALCIUM 20 MILLIGRAM(S): 10 TABLET ORAL at 21:52

## 2024-09-07 NOTE — ED ADULT NURSE NOTE - OBJECTIVE STATEMENT
Patient received complaining of chest pain / SOB has been on going since friday. Was recently seen here for cath placement on thursday, noted still feeling some discomfort / pressure since friday of discharge. States recently smoking cessation and started nicotine patches today (right chest wall) Patient is AOx4, EKG done, interventions implemented, safety precautions in place, awaiting evaluation

## 2024-09-07 NOTE — ED ADULT TRIAGE NOTE - CHIEF COMPLAINT QUOTE
patient had a stent placed here on Thursday. patient was discharged on Friday. patient has been feeling chest pain and Shortness of breath since going home. patient recently stopped smoking and is wearing a nicotine patch.

## 2024-09-07 NOTE — ED PROVIDER NOTE - OBJECTIVE STATEMENT
48-year-old male with history of hypertension and anxiety presents with chest tightness and shortness of breath the past couple days.  Patient does report some left arm pain and shortness of breath with exertion the past 8 to 9 months.  States he was seen multiple times over the past 8 to 9 months and had a negative stress test and negative troponins.  Symptoms continued.  Had angiography 2 days ago which showed 95% occlusion of LAD and stent was placed.  Was discharged yesterday.  Patient does report some mild chest tightness and hard to take a deep breath since the procedure.  Denies any calf pain or swelling.  No history of DVT or PE.  Patient states stopped smoking yesterday and has been on nicotine patch.    PCP June Miller

## 2024-09-07 NOTE — H&P ADULT - NSHPPHYSICALEXAM_GEN_ALL_CORE
Vitals last 24 hrs  T(C): 36.8 (09-07-24 @ 12:51), Max: 36.8 (09-07-24 @ 12:51)  HR: 72 (09-07-24 @ 12:51) (72 - 72)  BP: 132/87 (09-07-24 @ 12:51) (132/87 - 132/87)  RR: 18 (09-07-24 @ 12:51) (18 - 18)  SpO2: 98% (09-07-24 @ 12:51) (98% - 98%)    PHYSICAL EXAM:  GENERAL: NAD, well-groomed, well-developed  HEAD:  Atraumatic, Normocephalic  EYES: EOMI, PERRLA, conjunctiva and sclera clear  ENMT: No tonsillar erythema, exudates, or enlargement; Moist mucous membranes  NECK: Supple, No JVD, Normal thyroid  HEART: Regular rate and rhythm; No murmurs, rubs, or gallops  RESPIRATORY: CTA B/L, No W/R/R  ABDOMEN: Soft, Nontender, Nondistended; Bowel sounds present  NEUROLOGY: A&Ox3, nonfocal, moving all extremities  EXTREMITIES:  2+ Peripheral Pulses, No clubbing, cyanosis, or edema  SKIN: warm, dry, normal color, no rash or abnormal lesions

## 2024-09-07 NOTE — ED PROVIDER NOTE - PROGRESS NOTE DETAILS
Spoke with attending cardiologist, Dr. Maury Spain.  Will see patient in emergency room Patient stable.  Resting comfortably.  No tachycardia, hypoxia, or tachypnea.  CTA shows no evidence of PE.  There was some haziness in lung bases, possibly pleural effusions or CHF.  BNP was ordered and was normal.  Patient does report COVID 1 month ago.  Denies any cough or fevers.  Will repeat RVP.  Will plan to admit for observation, serial troponins, and consider repeat echo.  Spoke with attending hospitalist, Dr. Becerra and  accept patient for admission

## 2024-09-07 NOTE — ED ADULT NURSE NOTE - BREATH SOUNDS, LEFT
Subjective   Patient ID: Austin Wallace is a 8 y.o. male.    8yoM who started with nasal congestion, runny nose and cough, especially at night 5 days ago. Also, patient had a slight fever. No vomiting, no respiratory distress, no odynophagia, no diarrhea; overall normal PO and activity. Patient was seen 2 days ago; exam was benign and diagnosed with an URI. Only supportive measures were recommended.  2 days ago in the evening; patient started spiking higher fevers, Tmax of 102F today in the morning, last dose of Tylenol was yesterday. Also, patient started c/o mild sore throat. No ear pain, no vomiting, no diarrhea, no abdominal pain, no rash, normal amount of urine.        Review of Systems   Constitutional:  Positive for fever. Negative for activity change, appetite change and unexpected weight change.   HENT:  Positive for congestion, rhinorrhea and sore throat. Negative for ear discharge, ear pain, sinus pressure, sinus pain and trouble swallowing.    Eyes:  Negative for discharge and redness.   Respiratory:  Positive for cough. Negative for chest tightness, shortness of breath and wheezing.    Cardiovascular:  Negative for chest pain and palpitations.   Gastrointestinal:  Negative for abdominal pain, diarrhea and vomiting.   Genitourinary:  Negative for decreased urine volume and difficulty urinating.   Skin:  Negative for color change, pallor and rash.   Neurological:  Negative for dizziness, syncope, weakness, light-headedness and headaches.       Objective   Visit Vitals  Pulse 96   Temp (!) 38.6 °C (101.4 °F)   Resp 22         Physical Exam  Constitutional:       General: He is active. He is not in acute distress.     Appearance: He is not toxic-appearing.   HENT:      Head: Atraumatic.      Right Ear: Tympanic membrane and external ear normal.      Left Ear: Tympanic membrane and external ear normal.      Nose: Congestion and rhinorrhea present.      Mouth/Throat:      Mouth: Mucous membranes are moist.       Pharynx: No oropharyngeal exudate or posterior oropharyngeal erythema.   Eyes:      Extraocular Movements: Extraocular movements intact.      Conjunctiva/sclera: Conjunctivae normal.      Pupils: Pupils are equal, round, and reactive to light.   Cardiovascular:      Rate and Rhythm: Normal rate and regular rhythm.      Pulses: Normal pulses.      Heart sounds: Normal heart sounds. No murmur heard.  Pulmonary:      Effort: Pulmonary effort is normal. No respiratory distress or retractions.      Breath sounds: Normal breath sounds. No decreased air movement. No wheezing, rhonchi or rales.   Musculoskeletal:      Cervical back: Neck supple. No rigidity or tenderness.   Lymphadenopathy:      Cervical: No cervical adenopathy.   Skin:     General: Skin is warm and dry.      Capillary Refill: Capillary refill takes less than 2 seconds.      Coloration: Skin is not cyanotic.      Findings: No rash.   Neurological:      General: No focal deficit present.      Mental Status: He is alert.      Cranial Nerves: No cranial nerve deficit.      Sensory: No sensory deficit.      Motor: No weakness.       Assessment/Plan      1. Viral upper respiratory tract infection      Normal pulmonary, ear and throat exam. Because of high fever; most likely patient has Influenza or COVID. No complications like distress, dehydration, etc.      2. Fever in pediatric patient  POCT rapid strep A manually resulted    RSV PCR    Sars-CoV-2 and Influenza A/B PCR    Rapid Strep Test: NEGATIVE.         1) Explained to mother that viral infections tend to self resolve, no ATB's needed.  2) Continue plenty of fluids. Rest. Continue fever control with Tylenol.  3) Will check for COVID, Flu and RSV. F/U with results.  3) RTC/ER if fever >5 days, cough >10 days, respiratory distress, poor PO, poor activity, rash, etc.         clear

## 2024-09-07 NOTE — H&P ADULT - NSHPREVIEWOFSYSTEMS_GEN_ALL_CORE
REVIEW OF SYSTEMS:    CONSTITUTIONAL: No weakness, fevers or chills  EYES/ENT: No visual changes;  No vertigo or throat pain   NECK: No pain or stiffness  RESPIRATORY: No cough, wheezing, hemoptysis; No shortness of breath  CARDIOVASCULAR: + chest pain or palpitations  GASTROINTESTINAL: No abdominal or epigastric pain. No nausea, vomiting, or hematemesis; No diarrhea or constipation. No melena or hematochezia.  GENITOURINARY: No dysuria, frequency or hematuria  NEUROLOGICAL: No numbness or weakness  SKIN: No itching, rashes

## 2024-09-07 NOTE — ED PROVIDER NOTE - DIFFERENTIAL DIAGNOSIS
Differential Diagnosis Differentials include but not limited to pericarditis, myocarditis, ACS, pulmonary embolism, pleural effusion, anxiety reaction, infection

## 2024-09-07 NOTE — H&P ADULT - ASSESSMENT
48 year old male with a history of HTN, anxiety, CAD with recent PCI who presents with chest pain.  - ECG with sinus rhythm and no evidence of ischemia/infarction  CT Angio - no PE   Bilateral mild hazy lung opacities; possible early congestive heart   failure/pulmonary edema.    #Chest pain- unlikely acs  tele monitoring  C.E fu 2nd set  Continue dual antiplatelet therapy  fu cards    #HTN- cont enlapril    #Anxiety- resume paxil    [employer]   [phone]

## 2024-09-07 NOTE — H&P ADULT - NSHPLABSRESULTS_GEN_ALL_CORE
LABS:                        15.6   9.08  )-----------( 232      ( 07 Sep 2024 14:00 )             45.5     09-07    138  |  103  |  13  ----------------------------<  119<H>  3.8   |  27  |  1.20    Ca    9.7      07 Sep 2024 14:00  Mg     1.9     09-06    TPro  7.4  /  Alb  4.0  /  TBili  1.3<H>  /  DBili  x   /  AST  21  /  ALT  33  /  AlkPhos  90  09-07      CAPILLARY BLOOD GLUCOSE            Urinalysis Basic - ( 07 Sep 2024 14:00 )    Color: x / Appearance: x / SG: x / pH: x  Gluc: 119 mg/dL / Ketone: x  / Bili: x / Urobili: x   Blood: x / Protein: x / Nitrite: x   Leuk Esterase: x / RBC: x / WBC x   Sq Epi: x / Non Sq Epi: x / Bacteria: x        RADIOLOGY & ADDITIONAL TESTS:

## 2024-09-07 NOTE — ED PROVIDER NOTE - AGGRAVATING FACTORS
Initial Anesthesia Post-op Note    Patient: Kiara Davenport  Procedure(s) Performed: EGD  Anesthesia type: MAC    Vitals Value Taken Time   Temp 36 °C (96.8 °F) 12/29/20 0853   Pulse 73 12/29/20 0932   Resp 15 12/29/20 0932   SpO2 95 % 12/29/20 0932   /76 12/29/20 0931   Vitals shown include unvalidated device data.      Patient Location: PACU Phase 1  Post-op Vital Signs:stable  Level of Consciousness: awake and alert  Respiratory Status: spontaneous ventilation  Cardiovascular stable  Hydration: euvolemic  Pain Management: adequately controlled  Handoff: Handoff to receiving nurse was performed and questions were answered  Vomiting: none   Nausea: None  Airway Patency:patent  Post-op Assessment: no complications and patient tolerated procedure well with no complications      No complications documented.  
none

## 2024-09-07 NOTE — ED ADULT NURSE NOTE - MUSCULOSKELETAL ASSESSMENT
[place] Patient Age: 14 year old  MESSAGE:   Mom is calling to request a copy of pt's physical from 9/17/18, and updated vaccination records be faxed to her for school and sports.    Please fax to: [phone] attn: Catrina Grimes    Please follow up with Mom.  Routed to provider's clinical pool.        WEIGHT AND HEIGHT:   Wt Readings from Last 1 Encounters:   07/07/19 65.3 kg (143 lb 14.4 oz) (95 %, Z= 1.66)*     * Growth percentiles are based on CDC (Girls, 2-20 Years) data.     Ht Readings from Last 1 Encounters:   09/17/18 5' 1.5\" (1.562 m) (81 %, Z= 0.89)*     * Growth percentiles are based on CDC (Girls, 2-20 Years) data.     BMI Readings from Last 1 Encounters:   09/17/18 26.95 kg/m² (97 %, Z= 1.89)*     * Growth percentiles are based on CDC (Girls, 2-20 Years) data.       ALLERGIES:  Cephalosporins  Current Outpatient Medications   Medication   • norethindrone-ethinyl estradiol-FE (JUNEL FE 1/20) 1-20 MG-MCG per tablet   • metroNIDAZOLE (METROGEL VAGINAL) 0.75 % vaginal gel     No current facility-administered medications for this visit.      PHARMACY to use:           Pharmacy preference(s) on file:   [id number] - [address] [address]  Phone: [phone] Fax: [phone]    [facility] #[id number] - [address] AT [facility] & [address] [address]  Phone: [phone] Fax: [phone]      CALL BACK INFO: Ok to leave response (including medical information) on answering machine  ROUTING: Patient's physician/staff        PCP: Socrates Lakhani MD         INS: Payor: [employer] - [place] / Plan: [employer] - [employer] / Product Type: [place] Risk   PATIENT ADDRESS:  [address] [place] [address]     - - -

## 2024-09-07 NOTE — H&P ADULT - HISTORY OF PRESENT ILLNESS
48 year old male with a history of HTN, anxiety, CAD with recent PCI who presents with chest pain. He states he underwent PCI to LAD 2 days ago. He was having left chest and arm burning when he exerted himself. After procedure, this went away but he has now been having central chest discomfort that is worse when taking in a breath. It has been present for 48 hours since the stent was placed. He feels short of breath and lightheaded.

## 2024-09-07 NOTE — ED ADULT NURSE NOTE - ALCOHOL PRE SCREEN (AUDIT - C)
Patient presented after waiting 30 minutes with no reaction to  injections. Discharged from clinic.    Kaylee GRANT RN  Specialty/Allergy Clinics       Statement Selected

## 2024-09-07 NOTE — ED PROVIDER NOTE - CLINICAL SUMMARY MEDICAL DECISION MAKING FREE TEXT BOX
Adult male presents with chest pain 2 days after catheterization.  His vitals are stable.  EKG with no signs of ischemia.  Troponin negative x 2.  Cardiology consulted.  CTA negative for PE but does show signs of fluid overload.  Plan to admit to telemetry for pulm cardial consultation.

## 2024-09-07 NOTE — ED ADULT NURSE NOTE - NSFALLUNIVINTERV_ED_ALL_ED
Bed/Stretcher in lowest position, wheels locked, appropriate side rails in place/Call bell, personal items and telephone in reach/Instruct patient to call for assistance before getting out of bed/chair/stretcher/Non-slip footwear applied when patient is off stretcher/[place] to call system/Physically safe environment - no spills, clutter or unnecessary equipment/Purposeful proactive rounding/Room/bathroom lighting operational, light cord in reach

## 2024-09-07 NOTE — ED PROVIDER NOTE - ATTENDING APP SHARED VISIT CONTRIBUTION OF CARE
I, Davy Bryant, DO personally saw the patient with RICKY.  I have personally performed a face to face diagnostic evaluation on this patient.  I have reviewed the RICKY note and agree with the history, exam, and plan of care, except as noted.  I personally saw the patient and performed a substantive portion of the visit including all aspects of the medical decision making.

## 2024-09-08 VITALS
OXYGEN SATURATION: 97 % | RESPIRATION RATE: 16 BRPM | DIASTOLIC BLOOD PRESSURE: 75 MMHG | HEART RATE: 73 BPM | SYSTOLIC BLOOD PRESSURE: 143 MMHG | TEMPERATURE: 98 F

## 2024-09-08 LAB — PROCALCITONIN SERPL-MCNC: 0.05 NG/ML — SIGNIFICANT CHANGE UP

## 2024-09-08 PROCEDURE — 84145 PROCALCITONIN (PCT): CPT

## 2024-09-08 PROCEDURE — 85025 COMPLETE CBC W/AUTO DIFF WBC: CPT

## 2024-09-08 PROCEDURE — 99285 EMERGENCY DEPT VISIT HI MDM: CPT | Mod: 25

## 2024-09-08 PROCEDURE — 84484 ASSAY OF TROPONIN QUANT: CPT

## 2024-09-08 PROCEDURE — 80053 COMPREHEN METABOLIC PANEL: CPT

## 2024-09-08 PROCEDURE — 0225U NFCT DS DNA&RNA 21 SARSCOV2: CPT

## 2024-09-08 PROCEDURE — 93306 TTE W/DOPPLER COMPLETE: CPT

## 2024-09-08 PROCEDURE — 36415 COLL VENOUS BLD VENIPUNCTURE: CPT

## 2024-09-08 PROCEDURE — 71275 CT ANGIOGRAPHY CHEST: CPT | Mod: MC

## 2024-09-08 PROCEDURE — 93005 ELECTROCARDIOGRAM TRACING: CPT

## 2024-09-08 PROCEDURE — 83880 ASSAY OF NATRIURETIC PEPTIDE: CPT

## 2024-09-08 RX ADMIN — Medication 75 MILLIGRAM(S): at 11:51

## 2024-09-08 RX ADMIN — Medication 81 MILLIGRAM(S): at 11:51

## 2024-09-08 RX ADMIN — PAROXETINE 30 MILLIGRAM(S): 10 TABLET, FILM COATED ORAL at 11:51

## 2024-09-08 NOTE — DISCHARGE NOTE PROVIDER - NSDCFUSCHEDAPPT_GEN_ALL_CORE_FT
Mary Clark  [facility]  CARDIOLOGY [address]  Scheduled Appointment: 09/13/2024

## 2024-09-08 NOTE — DISCHARGE NOTE PROVIDER - NSDCMRMEDTOKEN_GEN_ALL_CORE_FT
aspirin 81 mg oral delayed release tablet: 1 tab(s) orally once a day  clopidogrel 75 mg oral tablet: 1 tab(s) orally every 24 hours  enalapril 10 mg oral tablet: 1 tab(s) orally once a day  Paxil 30 mg oral tablet: 1 tab(s) orally once a day  rosuvastatin 20 mg oral tablet: 1 tab(s) orally once a day (at bedtime)

## 2024-09-08 NOTE — DISCHARGE NOTE NURSING/CASE MANAGEMENT/SOCIAL WORK - PATIENT PORTAL LINK FT
You can access the FollowMyHealth Patient Portal offered by [facility] by registering at the following website: [email or web address]. By joining [employer]’s FollowMyHealth portal, you will also be able to view your health information using other applications (apps) compatible with our system.

## 2024-09-08 NOTE — DISCHARGE NOTE PROVIDER - CARE PROVIDER_API CALL
June Webb [address] [address]  Phone: [phone]  Fax: [phone]  Follow Up Time: 2 weeks   June Webb [facility]  [address] [place] [address]  Phone: [phone]  Fax: [phone]  Follow Up Time: 2 weeks    Juan Lima  Pulmonary Disease  [address] [facility], [address]  [place] [address]  Phone: [phone]  Fax: [phone]  Follow Up Time: 2 weeks

## 2024-09-08 NOTE — PROGRESS NOTE ADULT - SUBJECTIVE AND OBJECTIVE BOX
Chief Complaint: Chest pain, shortness of breath    Interval Events: No events overnight.    Review of Systems:  General: No fevers, chills, weight gain  Skin: No rashes, color changes  Cardiovascular: No chest pain, orthopnea  Respiratory: No shortness of breath, cough  Gastrointestinal: No nausea, abdominal pain  Genitourinary: No incontinence, pain with urination  Musculoskeletal: No pain, swelling, decreased range of motion  Neurological: No headache, weakness  Psychiatric: No depression, anxiety  Endocrine: No weight gain, increased thirst  All other systems are comprehensively negative.    Physical Exam:  Vitals:        Vital Signs Last 24 Hrs  T(C): 36.6 (08 Sep 2024 08:24), Max: 36.8 (07 Sep 2024 12:51)  T(F): 97.9 (08 Sep 2024 08:24), Max: 98.3 (08 Sep 2024 04:55)  HR: 73 (08 Sep 2024 08:24) (68 - 81)  BP: 143/75 (08 Sep 2024 08:24) (106/67 - 143/75)  BP(mean): --  RR: 16 (08 Sep 2024 08:24) (16 - 19)  SpO2: 97% (08 Sep 2024 08:24) (96% - 98%)  Parameters below as of 08 Sep 2024 08:24  Patient On (Oxygen Delivery Method): room air  General: NAD  HEENT: MMM  Neck: No JVD, no carotid bruit  Lungs: CTAB  CV: RRR, nl S1/S2, no M/R/G  Abdomen: S/NT/ND, +BS  Extremities: No LE edema, no cyanosis  Neuro: AAOx3, non-focal  Skin: No rash    Labs:                        15.6   9.08  )-----------( 232      ( 07 Sep 2024 14:00 )             45.5     09-07    138  |  103  |  13  ----------------------------<  119<H>  3.8   |  27  |  1.20    Ca    9.7      07 Sep 2024 14:00    TPro  7.4  /  Alb  4.0  /  TBili  1.3<H>  /  DBili  x   /  AST  21  /  ALT  33  /  AlkPhos  90  09-07            ECG/Telemetry: Sinus rhythm

## 2024-09-08 NOTE — CONSULT NOTE ADULT - SUBJECTIVE AND OBJECTIVE BOX
CHIEF COMPLAINT/ REASON FOR VISIT  .. Patient was seen to address the  issue listed under PROBLEM LIST which is located toward bottom of this note     MARCIA FLOYD APER 01    Allergies    penicillin (Unknown)    Intolerances        PAST MEDICAL & SURGICAL HISTORY:  Anxiety      HTN (hypertension)      H/O rhinoplasty          FAMILY HISTORY:      Home Medications:  clopidogrel 75 mg oral tablet: 1 tab(s) orally every 24 hours (06 Sep 2024 08:03)  enalapril 10 mg oral tablet: 1 tab(s) orally once a day (05 Sep 2024 15:36)  Paxil 30 mg oral tablet: 1 tab(s) orally once a day (05 Sep 2024 15:36)      MEDICATIONS  (STANDING):  aspirin enteric coated 81 milliGRAM(s) Oral daily  clopidogrel Tablet 75 milliGRAM(s) Oral daily  enalapril 10 milliGRAM(s) Oral daily  PARoxetine 30 milliGRAM(s) Oral daily  rosuvastatin 20 milliGRAM(s) Oral at bedtime    MEDICATIONS  (PRN):  acetaminophen     Tablet .. 650 milliGRAM(s) Oral every 6 hours PRN Temp greater or equal to 38.5C (101.3F), Moderate Pain (4 - 6)      Diet, DASH/TLC:   Sodium & Cholesterol Restricted (09-07-24 @ 20:25) [Active]          Vital Signs Last 24 Hrs  T(C): 36.6 (08 Sep 2024 08:24), Max: 36.8 (07 Sep 2024 20:46)  T(F): 97.9 (08 Sep 2024 08:24), Max: 98.3 (08 Sep 2024 04:55)  HR: 73 (08 Sep 2024 08:24) (68 - 81)  BP: 143/75 (08 Sep 2024 08:24) (106/67 - 143/75)  BP(mean): --  RR: 16 (08 Sep 2024 08:24) (16 - 19)  SpO2: 97% (08 Sep 2024 08:24) (96% - 97%)    Parameters below as of 08 Sep 2024 08:24  Patient On (Oxygen Delivery Method): room air                  LABS:                        15.6   9.08  )-----------( 232      ( 07 Sep 2024 14:00 )             45.5     09-07    138  |  103  |  13  ----------------------------<  119<H>  3.8   |  27  |  1.20    Ca    9.7      07 Sep 2024 14:00    TPro  7.4  /  Alb  4.0  /  TBili  1.3<H>  /  DBili  x   /  AST  21  /  ALT  33  /  AlkPhos  90  09-07      Urinalysis Basic - ( 07 Sep 2024 14:00 )    Color: x / Appearance: x / SG: x / pH: x  Gluc: 119 mg/dL / Ketone: x  / Bili: x / Urobili: x   Blood: x / Protein: x / Nitrite: x   Leuk Esterase: x / RBC: x / WBC x   Sq Epi: x / Non Sq Epi: x / Bacteria: x            WBC:  WBC Count: 9.08 K/uL (09-07 @ 14:00)  WBC Count: 9.51 K/uL (09-06 @ 06:06)  WBC Count: 7.65 K/uL (09-05 @ 08:06)      MICROBIOLOGY:  RECENT CULTURES:                  Sodium:  Sodium: 138 mmol/L (09-07 @ 14:00)  Sodium: 139 mmol/L (09-06 @ 06:06)  Sodium: 138 mmol/L (09-05 @ 08:06)      1.20 mg/dL 09-07 @ 14:00  1.10 mg/dL 09-06 @ 06:06  1.10 mg/dL 09-05 @ 08:06      Hemoglobin:  Hemoglobin: 15.6 g/dL (09-07 @ 14:00)  Hemoglobin: 14.7 g/dL (09-06 @ 06:06)  Hemoglobin: 15.3 g/dL (09-05 @ 08:06)      Platelets: Platelet Count - Automated: 232 K/uL (09-07 @ 14:00)  Platelet Count - Automated: 211 K/uL (09-06 @ 06:06)  Platelet Count - Automated: 218 K/uL (09-05 @ 08:06)      LIVER FUNCTIONS - ( 07 Sep 2024 14:00 )  Alb: 4.0 g/dL / Pro: 7.4 g/dL / ALK PHOS: 90 U/L / ALT: 33 U/L / AST: 21 U/L / GGT: x             Urinalysis Basic - ( 07 Sep 2024 14:00 )    Color: x / Appearance: x / SG: x / pH: x  Gluc: 119 mg/dL / Ketone: x  / Bili: x / Urobili: x   Blood: x / Protein: x / Nitrite: x   Leuk Esterase: x / RBC: x / WBC x   Sq Epi: x / Non Sq Epi: x / Bacteria: x        RADIOLOGY & ADDITIONAL STUDIES:      MICROBIOLOGY:  RECENT CULTURES:          
History of Present Illness: The patient is a 48 year old male with a history of HTN, anxiety, CAD with recent PCI who presents with chest pain. He states he underwent PCI to LAD 2 days ago. He was having left chest and arm burning when he exerted himself. After procedure, this went away but he has now been having central chest discomfort that is worse when taking in a breath. It has been present for 48 hours since the stent was placed. He feels short of breath and lightheaded.    Past Medical/Surgical History:  HTN, anxiety, CAD with recent PCI    Medications:  Home Medications:  clopidogrel 75 mg oral tablet: 1 tab(s) orally every 24 hours (06 Sep 2024 08:03)  enalapril 10 mg oral tablet: 1 tab(s) orally once a day (05 Sep 2024 15:36)  nicotine 21 mg/24 hr transdermal film, extended release: 1 patch transdermal once a day (06 Sep 2024 08:03)  Paxil 30 mg oral tablet: 1 tab(s) orally once a day (05 Sep 2024 15:36)      Family History: Non-contributory family history of premature cardiovascular atherosclerotic disease    Social History: Prior tobacco use    Review of Systems:  General: No fevers, chills, weight gain  Skin: No rashes, color changes  Cardiovascular: +chest pain, orthopnea  Respiratory: No shortness of breath, cough  Gastrointestinal: No nausea, abdominal pain  Genitourinary: No incontinence, pain with urination  Musculoskeletal: No pain, swelling, decreased range of motion  Neurological: No headache, weakness  Psychiatric: No depression, anxiety  Endocrine: No weight gain, increased thirst  All other systems are comprehensively negative.    Physical Exam:  Vitals:        Vital Signs Last 24 Hrs  T(C): 36.8 (07 Sep 2024 12:51), Max: 36.8 (07 Sep 2024 12:51)  T(F): 98.2 (07 Sep 2024 12:51), Max: 98.2 (07 Sep 2024 12:51)  HR: 72 (07 Sep 2024 12:51) (72 - 72)  BP: 132/87 (07 Sep 2024 12:51) (132/87 - 132/87)  BP(mean): --  RR: 18 (07 Sep 2024 12:51) (18 - 18)  SpO2: 98% (07 Sep 2024 12:51) (98% - 98%)    Parameters below as of 07 Sep 2024 12:51  Patient On (Oxygen Delivery Method): room air      General: NAD  HEENT: MMM  Neck: No JVD, no carotid bruit  Lungs: CTAB  CV: RRR, nl S1/S2, no M/R/G  Abdomen: S/NT/ND, +BS  Extremities: No LE edema, no cyanosis  Neuro: AAOx3, non-focal  Skin: No rash    Labs:                        15.6   9.08  )-----------( 232      ( 07 Sep 2024 14:00 )             45.5     09-07    138  |  103  |  13  ----------------------------<  119<H>  3.8   |  27  |  1.20    Ca    9.7      07 Sep 2024 14:00  Mg     1.9     09-06    TPro  7.4  /  Alb  4.0  /  TBili  1.3<H>  /  DBili  x   /  AST  21  /  ALT  33  /  AlkPhos  90  09-07            ECG/Telemetry: NSR, normal axis, no ST abnormality

## 2024-09-08 NOTE — DISCHARGE NOTE PROVIDER - NSDCCPCAREPLAN_GEN_ALL_CORE_FT
PRINCIPAL DISCHARGE DIAGNOSIS  Diagnosis: Chest pain  Assessment and Plan of Treatment: CAD with recent PCI who presents with chest pain.  - ECG with sinus rhythm and no evidence of ischemia/infarction  CT Angio - no PE, Bilateral mild hazy lung opacities; possible early congestive heart   failure/pulmonary edema.  #Chest pain- ruled out acs  Continue dual antiplatelet therapy  symptoms resolved  #pulm opacities on CT- likely old infection/other pulm pathology  pt asymptomatic  outpt pulm fu as needed  #HTN- cont enlapril  #Anxiety- resume paxil      SECONDARY DISCHARGE DIAGNOSES  Diagnosis: Shortness of breath  Assessment and Plan of Treatment:

## 2024-09-08 NOTE — DISCHARGE NOTE PROVIDER - HOSPITAL COURSE
48 year old male with a history of HTN, anxiety, CAD with recent PCI who presents with chest pain.  - ECG with sinus rhythm and no evidence of ischemia/infarction  CT Angio - no PE, Bilateral mild hazy lung opacities; possible early congestive heart   failure/pulmonary edema.    #Chest pain- unlikely acs  tele monitoring  C.E fu 2nd set neg  Continue dual antiplatelet therapy    #pulm opacities- likely old infection/pulm pathology  pt asymptomatic  outpt pulm fu    #HTN- cont enlapril    #Anxiety- resume paxil

## 2024-09-08 NOTE — ED ADULT NURSE REASSESSMENT NOTE - NSFALLUNIVINTERV_ED_ALL_ED
Bed/Stretcher in lowest position, wheels locked, appropriate side rails in place/Call bell, personal items and telephone in reach/Instruct patient to call for assistance before getting out of bed/chair/stretcher/Non-slip footwear applied when patient is off stretcher/[facility] to call system/Physically safe environment - no spills, clutter or unnecessary equipment/Purposeful proactive rounding/Room/bathroom lighting operational, light cord in reach
Bed/Stretcher in lowest position, wheels locked, appropriate side rails in place/Call bell, personal items and telephone in reach/Instruct patient to call for assistance before getting out of bed/chair/stretcher/Non-slip footwear applied when patient is off stretcher/[place] to call system/Physically safe environment - no spills, clutter or unnecessary equipment/Purposeful proactive rounding/Room/bathroom lighting operational, light cord in reach

## 2024-09-08 NOTE — ED ADULT NURSE REASSESSMENT NOTE - NS ED NURSE REASSESS COMMENT FT1
received pt in rm 16b resting comfortably in stretcher, aox4, denies pain @ this time. iv lock 20g noted to LAC, patent and flushing. no s/s redness, swelling or infiltration. pending bed assignment.
Received pt in room 16b  aox4 breathing comfortably on room air, denies pain at this time. IV L 20g noted to LAC, patent and flushing. no s/s redness, swelling or infiltration. Pending bed assignment.

## 2024-09-08 NOTE — DISCHARGE NOTE NURSING/CASE MANAGEMENT/SOCIAL WORK - NSDCPEFALRISK_GEN_ALL_CORE
For information on Fall & Injury Prevention, visit: [email or web address] OR  [email or web address] OR  https://www.cdc.gov/steadi/patient.html

## 2024-09-08 NOTE — DISCHARGE NOTE PROVIDER - PROVIDER TOKENS
PROVIDER:[TOKEN:[[id number]],FOLLOWUP:[2 weeks]] PROVIDER:[TOKEN:[[id number]],FOLLOWUP:[2 weeks]],PROVIDER:[TOKEN:[[id number]],FOLLOWUP:[2 weeks]]

## 2024-09-08 NOTE — CONSULT NOTE ADULT - ASSESSMENT
The patient is a 48 year old male with a history of HTN, anxiety, CAD with recent PCI who presents with chest pain.    Plan:  - Chest discomfort symptoms may be secondary to angioplasty and should subside if no other etiology is found  - ECG with sinus rhythm and no evidence of ischemia/infarction  - Given duration of symptoms, an acute MI is ruled out with one set of cardiac enzymes. Will check second set.  - Check CTA chest to r/o pulmonary embolism or pericardial effusion  - Continue dual antiplatelet therapy  - Doubt medications are a factor for symptoms (he was prescribed clopidogrel and not ticagrelor)  - If above work-up unremarkable, then no further inpatient testing indicated and the patient will follow-up with Cinthia Miller and Amber
   Initial evaluation/Pulmonary Critical Care consultation requested by  DR WANG on 9/8/2024 from Dr Eros Sharp    Patient examined chart reviewed    HOSPITAL ADMISSION   PATIENT CAME  FROM (if information available)      REASON FOR VISIT  .. Management of problems listed below        REVIEW OF SYMPTOMS   Able to give ROS  Yes     RELIABILITY +/-   CONSTITUTIONAL Weakness Yes    ENDOCRINE  No heat or cold intolerance    ALLERGY No hives  Sore throat No stridor  RESP Shortness of breath YES   NEURO New weakness No   CARDIAC   Palpitations No         PHYSICAL EXAM    HEENT Unremarkable  atraumatic   RESP Fair air entry  Harsh breath sound   CARDIAC S1 S2 No S3     NO JVD    ABDOMEN No hepatosplenomegaly   PEDAL EDEMA present No calf tenderness  NO rash       [id number]  BEST PRACTICE ISSUES.  . HOB ELEVATN.    .... Yes  . DIET.   .... DASH  . IV FLUIDS.  ....   . DVT PPLX.    ....   . STRESS ULCER PPLX.   ....   . INFECTION PPLX.   ....     [id number]  GENERAL DATA .   GOC.    ..  9/8/2024 full code   ICU STAY.    .. no   COVID.   .. scv2 9/8/2024 (-)   ALLGY.   ..  pncl      WT.   ..      [id number]  PROBLEM ASSESSMENT RECOMMENDATIONS.  RESP.   . SHORTNESS OF BREATH   .... DD MI CHF VTE COPD PNEUMONIA   .... Pt had stent placed 9/5  .... PE excluded on 9/8/2024 cta   .... Pt was counseld to stop smoking and is using nicotine patch  .... COVID was excluded 9/7   .... No clear cause of breathing difficulty found except pergaps mild chf based on ct chest though pBNP is low and ECHO showed good EF      . GAS EXCHANGE   .... 9/8/2024 RA PO 97%  .... Keep po above 90     . SMOKER  .... 9/8/2024 Is using nicotine pathc  .... 9/8/2024 counseled to stop     . RO VTE   .... cta ch 9/7/2024   ........ no pe   ........ bilateral mild hazy lung opacities possible early CHF/pulmonary edema   .... PE ruled out     INFECTION.  . RO PNEUMONIA   .... w 9/6-9/7/2024 w 9.5-9.9   .... pr 9/8/2024 pr (-)   .... rvp 9/7 (-)   .... Pneumonia unlikely     CARDIAC.  . CAD   ... Tr 9/7-9/7-9/7 Tr 58 - 65 - 70   .... ekg biphasic t waves V4-V6   .... 9/7 asa 81  .... 9/7 plavx 75   .... 9/7 enalapril 10   .... MI ruled out    . CATH   ... cath 9/5/2024   ........ 9/5/2024 [facility] Critical prox LAD disease Moderate RCA and LCx dis  .. AR   ..... 9/8/2024 DAPT X 6 M     . CHF.  .... pbnp 9/8/2024 pbnp 39   .... tte 9/7/2024   ........ ef 55%   .... Pt is not in decompensated chf    HEMAT.  .... Hb 9/8/2024 Hb 15   .... monitor     GI.  .... LFTS 9/8/2024   ....... AP 90   ........ AST 21   ........ ALT 33   ....monitor     RENAL.  .... Na 9/8/2024 Na 138   .... K 9/8/2024 K 3.8   .... co2 9/8/2024 co2 27   .... Cr 9/8/2024 Cr 1.2   .... monitor     ENDO.  NEURO.  .... 9/7 paxil 30   SKIN.    [id number]   CHIEF COMPLAINT.   OVERALL PRESENTATION.  . 48 year old male smoker quit 9/5 No fam ho cad no personal ho chf copd asthma sleep apnea  with PMH  HTN, anxiety, CAD with recent PCI who presents with chest pain. He states he underwent PCI to LAD 2 days ago. He was having left chest and arm burning when he exerted himself. After procedure, this went away but he has now been having central chest discomfort that is worse when taking in a breath. It has been present for 48 hours since the stent was placed. He feels short of breath and lightheaded.   . Pulm consulted 9/8/2024 for shortness of breath Pt eager to be dced    COURSE     .  HOME MEDS.    PMH.    PROBLEMS .  . SHORTNESS OF BREATH   .... DD MI CHF VTE COPD PNEUMONIA   . SMOKER   .... 9/8/2024 Quit 3 d ago   PROCEDURES/DEVICES.  .... 9/5/2024 [facility]ASPER Critical prox LAD disease Moderate RCA and LCx dis      .      TIME SPENT.  . Over 55 minutes aggregate care time spent on encounter; activities included   direct patient care, counseling and/or coordinating care reviewing notes, lab data/ imaging , discussion with multidisciplinary team/ patient  /family and explaining in detail risks, benefits, alternatives  of the recommendations     JUAN J BOOTHE 48 m 9/7/2024 1977 DR NELLA CHICAS

## 2024-09-08 NOTE — DISCHARGE NOTE PROVIDER - CARE PROVIDERS DIRECT ADDRESSES
[email or web address]

## 2024-09-12 ENCOUNTER — NON-APPOINTMENT (OUTPATIENT)
Age: 49
End: 2024-09-12

## 2024-09-13 ENCOUNTER — APPOINTMENT (OUTPATIENT)
Dept: CARDIOLOGY | Facility: CLINIC | Age: 49
End: 2024-09-13
Payer: COMMERCIAL

## 2024-09-13 ENCOUNTER — NON-APPOINTMENT (OUTPATIENT)
Age: 49
End: 2024-09-13

## 2024-09-13 VITALS
BODY MASS INDEX: 28.92 KG/M2 | DIASTOLIC BLOOD PRESSURE: 91 MMHG | HEART RATE: 79 BPM | SYSTOLIC BLOOD PRESSURE: 141 MMHG | TEMPERATURE: 97.4 F | WEIGHT: 202 LBS | OXYGEN SATURATION: 99 % | HEIGHT: 70 IN

## 2024-09-13 DIAGNOSIS — I25.10 ATHEROSCLEROTIC HEART DISEASE OF NATIVE CORONARY ARTERY W/OUT ANGINA PECTORIS: ICD-10-CM

## 2024-09-13 PROCEDURE — 99214 OFFICE O/P EST MOD 30 MIN: CPT | Mod: 25

## 2024-09-13 PROCEDURE — 93000 ELECTROCARDIOGRAM COMPLETE: CPT

## 2024-09-13 RX ORDER — PAROXETINE HYDROCHLORIDE 40 MG/1
TABLET, FILM COATED ORAL
Refills: 0 | Status: ACTIVE | COMMUNITY

## 2024-09-13 RX ORDER — ASPIRIN 81 MG
81 TABLET, DELAYED RELEASE (ENTERIC COATED) ORAL
Refills: 0 | Status: ACTIVE | COMMUNITY

## 2024-09-13 RX ORDER — CLOPIDOGREL 75 MG/1
TABLET, FILM COATED ORAL
Refills: 0 | Status: ACTIVE | COMMUNITY

## 2024-09-13 RX ORDER — ROSUVASTATIN CALCIUM 5 MG/1
TABLET, FILM COATED ORAL
Refills: 0 | Status: ACTIVE | COMMUNITY

## 2024-09-13 RX ORDER — ENALAPRIL MALEATE 1 MG/ML
SOLUTION ORAL
Refills: 0 | Status: ACTIVE | COMMUNITY

## 2024-09-13 RX ORDER — BUPROPION HYDROCHLORIDE 100 MG/1
TABLET, FILM COATED ORAL
Refills: 0 | Status: ACTIVE | COMMUNITY

## 2024-09-13 NOTE — HISTORY OF PRESENT ILLNESS
[FreeTextEntry1] : Seeing me for access site check - s/p LAD PCI for unstable angina Was readmitted for  atypical CP/SOB; neg trop and normal Echo here for eval Feels well Anxious access site healing well

## 2024-09-13 NOTE — DISCUSSION/SUMMARY
[FreeTextEntry1] : s/p LAD PCI  Access site healing well Has stopped smoking Recc to remain complaint to DAPT Carotid, ABD and peripheral US ordered F/u with Dr. Miller  [EKG obtained to assist in diagnosis and management of assessed problem(s)] : EKG obtained to assist in diagnosis and management of assessed problem(s)

## 2024-09-16 ENCOUNTER — APPOINTMENT (OUTPATIENT)
Dept: ULTRASOUND IMAGING | Facility: CLINIC | Age: 49
End: 2024-09-16
Payer: COMMERCIAL

## 2024-09-16 PROCEDURE — 93925 LOWER EXTREMITY STUDY: CPT

## 2024-09-16 PROCEDURE — 76775 US EXAM ABDO BACK WALL LIM: CPT

## 2024-09-16 PROCEDURE — 93880 EXTRACRANIAL BILAT STUDY: CPT

## 2024-09-21 ENCOUNTER — NON-APPOINTMENT (OUTPATIENT)
Age: 49
End: 2024-09-21

## 2024-09-22 RX ORDER — ROSUVASTATIN CALCIUM 10 MG/1
1 TABLET ORAL
Qty: 90 | Refills: 3
Start: 2024-09-22 | End: 2025-09-16

## 2024-09-22 RX ORDER — ASPIRIN 81 MG
1 TABLET, DELAYED RELEASE (ENTERIC COATED) ORAL
Qty: 90 | Refills: 3
Start: 2024-09-22 | End: 2025-09-16

## 2024-09-25 ENCOUNTER — NON-APPOINTMENT (OUTPATIENT)
Age: 49
End: 2024-09-25

## 2024-09-25 ENCOUNTER — APPOINTMENT (OUTPATIENT)
Dept: OTOLARYNGOLOGY | Facility: CLINIC | Age: 49
End: 2024-09-25
Payer: COMMERCIAL

## 2024-09-25 VITALS
BODY MASS INDEX: 28.92 KG/M2 | SYSTOLIC BLOOD PRESSURE: 120 MMHG | DIASTOLIC BLOOD PRESSURE: 79 MMHG | HEART RATE: 80 BPM | HEIGHT: 70 IN | WEIGHT: 202 LBS

## 2024-09-25 DIAGNOSIS — F43.22 ADJUSTMENT DISORDER WITH ANXIETY: ICD-10-CM

## 2024-09-25 DIAGNOSIS — H92.09 OTALGIA, UNSPECIFIED EAR: ICD-10-CM

## 2024-09-25 DIAGNOSIS — Z86.79 PERSONAL HISTORY OF OTHER DISEASES OF THE CIRCULATORY SYSTEM: ICD-10-CM

## 2024-09-25 DIAGNOSIS — H60.90 UNSPECIFIED OTITIS EXTERNA, UNSPECIFIED EAR: ICD-10-CM

## 2024-09-25 DIAGNOSIS — Z86.59 PERSONAL HISTORY OF OTHER MENTAL AND BEHAVIORAL DISORDERS: ICD-10-CM

## 2024-09-25 PROCEDURE — 99203 OFFICE O/P NEW LOW 30 MIN: CPT | Mod: 25

## 2024-09-25 PROCEDURE — 92567 TYMPANOMETRY: CPT

## 2024-09-25 PROCEDURE — 31231 NASAL ENDOSCOPY DX: CPT

## 2024-09-25 RX ORDER — CIPROFLOXACIN AND DEXAMETHASONE 3; 1 MG/ML; MG/ML
0.3-0.1 SUSPENSION/ DROPS AURICULAR (OTIC)
Qty: 1 | Refills: 2 | Status: ACTIVE | COMMUNITY
Start: 2024-09-25 | End: 1900-01-01

## 2024-09-25 NOTE — ASSESSMENT
[FreeTextEntry1] : CIPRODEX OTOWICK REMOVAL 1 DAY WATER PERCAUTION DENTAL EXAM TMJ INSTRUCTIONS F/U 2 WEEKS PRN TYMP TYPE A

## 2024-09-25 NOTE — PHYSICAL EXAM
[Normal] : mucosa is normal [Midline] : trachea located in midline position [de-identified] : LEFT CANAL IRRITATION

## 2024-09-25 NOTE — HISTORY OF PRESENT ILLNESS
[de-identified] : LEFT EAR PAIN X 10 DAYS MEDICAL HX REVIEWED STOPPED SMOKING X 3 WEEKS SMOKING HX X MANY YEARS ONE PACK A DAY

## 2024-09-25 NOTE — REVIEW OF SYSTEMS
[Ear Pain] : ear pain [Dizziness] : dizziness [Lightheadedness] : lightheadedness [Problem Snoring] : problem snoring [Chest Pain] : chest pain [Shortness Of Breath] : shortness of breath [Joint Pain] : joint pain [Anxiety] : anxiety [Depression] : depression [Negative] : Endocrine [Patient Intake Form Reviewed] : Patient intake form was reviewed [FreeTextEntry9] : Muscle aches [de-identified] : Headache [de-identified] : Sweating at night  [FreeTextEntry1] : Daytime sleepiness

## 2025-02-27 ENCOUNTER — APPOINTMENT (OUTPATIENT)
Dept: CARDIOLOGY | Facility: CLINIC | Age: 50
End: 2025-02-27